# Patient Record
Sex: FEMALE | Race: WHITE | NOT HISPANIC OR LATINO | Employment: UNEMPLOYED | URBAN - NONMETROPOLITAN AREA
[De-identification: names, ages, dates, MRNs, and addresses within clinical notes are randomized per-mention and may not be internally consistent; named-entity substitution may affect disease eponyms.]

---

## 2017-12-18 PROCEDURE — 83001 ASSAY OF GONADOTROPIN (FSH): CPT | Performed by: INTERNAL MEDICINE

## 2018-01-09 PROCEDURE — 87624 HPV HI-RISK TYP POOLED RSLT: CPT | Performed by: OBSTETRICS & GYNECOLOGY

## 2018-01-09 PROCEDURE — 88175 CYTOPATH C/V AUTO FLUID REDO: CPT | Performed by: OBSTETRICS & GYNECOLOGY

## 2023-03-09 LAB
ALBUMIN (MG/L) IN URINE: <7 MG/L
ALBUMIN/CREATININE (UG/MG) IN URINE: NORMAL UG/MG CRT (ref 0–30)
APPEARANCE, URINE: CLEAR
BACTERIA, URINE: ABNORMAL /HPF
BILIRUBIN, URINE: NEGATIVE
BLOOD, URINE: NEGATIVE
COLOR, URINE: YELLOW
CREATININE (MG/DL) IN URINE: 34.3 MG/DL (ref 20–320)
GLUCOSE, URINE: NEGATIVE MG/DL
KETONES, URINE: NEGATIVE MG/DL
LEUKOCYTE ESTERASE, URINE: ABNORMAL
MUCUS, URINE: ABNORMAL /LPF
NITRITE, URINE: NEGATIVE
PH, URINE: 6 (ref 5–8)
PROTEIN, URINE: NEGATIVE MG/DL
RBC, URINE: 1 /HPF (ref 0–5)
SPECIFIC GRAVITY, URINE: 1.01 (ref 1–1.03)
SQUAMOUS EPITHELIAL CELLS, URINE: <1 /HPF
UROBILINOGEN, URINE: <2 MG/DL (ref 0–1.9)
WBC, URINE: 1 /HPF (ref 0–5)

## 2023-04-15 LAB — CALCIDIOL (25 OH VITAMIN D3) (NG/ML) IN SER/PLAS: 56 NG/ML

## 2023-10-06 ASSESSMENT — PROMIS GLOBAL HEALTH SCALE
RATE_AVERAGE_PAIN: 3
RATE_PHYSICAL_HEALTH: FAIR
EMOTIONAL_PROBLEMS: SOMETIMES
CARRYOUT_PHYSICAL_ACTIVITIES: MOSTLY
RATE_AVERAGE_FATIGUE: MILD
RATE_QUALITY_OF_LIFE: GOOD
RATE_GENERAL_HEALTH: GOOD
CARRYOUT_SOCIAL_ACTIVITIES: GOOD
RATE_MENTAL_HEALTH: GOOD
RATE_SOCIAL_SATISFACTION: GOOD

## 2023-10-09 ENCOUNTER — OFFICE VISIT (OUTPATIENT)
Dept: PRIMARY CARE | Facility: CLINIC | Age: 53
End: 2023-10-09
Payer: COMMERCIAL

## 2023-10-09 VITALS
HEIGHT: 67 IN | DIASTOLIC BLOOD PRESSURE: 74 MMHG | SYSTOLIC BLOOD PRESSURE: 120 MMHG | WEIGHT: 117.3 LBS | OXYGEN SATURATION: 98 % | HEART RATE: 62 BPM | BODY MASS INDEX: 18.41 KG/M2 | RESPIRATION RATE: 16 BRPM | TEMPERATURE: 96.1 F

## 2023-10-09 DIAGNOSIS — Z00.00 ENCOUNTER FOR WELLNESS EXAMINATION IN ADULT: Primary | ICD-10-CM

## 2023-10-09 DIAGNOSIS — Z23 IMMUNIZATION DUE: ICD-10-CM

## 2023-10-09 DIAGNOSIS — E56.9 VITAMIN DEFICIENCY: ICD-10-CM

## 2023-10-09 PROBLEM — Q63.9 STRUCTURAL ABNORMALITY OF KIDNEY: Status: ACTIVE | Noted: 2023-10-09

## 2023-10-09 PROBLEM — M77.11 LATERAL EPICONDYLITIS OF BOTH ELBOWS: Status: ACTIVE | Noted: 2023-10-09

## 2023-10-09 PROBLEM — M77.12 LATERAL EPICONDYLITIS OF BOTH ELBOWS: Status: ACTIVE | Noted: 2023-10-09

## 2023-10-09 PROBLEM — B00.9 RECURRENT HSV (HERPES SIMPLEX VIRUS): Status: ACTIVE | Noted: 2023-10-09

## 2023-10-09 PROBLEM — R63.5 ABNORMAL WEIGHT GAIN: Status: ACTIVE | Noted: 2023-10-09

## 2023-10-09 PROBLEM — R63.5 ABNORMAL WEIGHT GAIN: Status: RESOLVED | Noted: 2023-10-09 | Resolved: 2023-10-09

## 2023-10-09 PROBLEM — F41.9 ANXIETY: Status: ACTIVE | Noted: 2022-10-06

## 2023-10-09 PROBLEM — M85.80 OSTEOPENIA: Status: ACTIVE | Noted: 2023-10-09

## 2023-10-09 PROBLEM — K21.9 GASTROESOPHAGEAL REFLUX DISEASE: Status: ACTIVE | Noted: 2022-10-06

## 2023-10-09 PROCEDURE — 1036F TOBACCO NON-USER: CPT | Performed by: FAMILY MEDICINE

## 2023-10-09 PROCEDURE — 90471 IMMUNIZATION ADMIN: CPT | Performed by: FAMILY MEDICINE

## 2023-10-09 PROCEDURE — 90715 TDAP VACCINE 7 YRS/> IM: CPT | Performed by: FAMILY MEDICINE

## 2023-10-09 PROCEDURE — 3008F BODY MASS INDEX DOCD: CPT | Performed by: FAMILY MEDICINE

## 2023-10-09 PROCEDURE — 99396 PREV VISIT EST AGE 40-64: CPT | Performed by: FAMILY MEDICINE

## 2023-10-09 RX ORDER — GLUCOSAM/CHONDRO/HERB 149/HYAL 750-100 MG
TABLET ORAL
COMMUNITY

## 2023-10-09 RX ORDER — DULOXETIN HYDROCHLORIDE 60 MG/1
1 CAPSULE, DELAYED RELEASE ORAL 2 TIMES DAILY
COMMUNITY
Start: 2019-12-30

## 2023-10-09 RX ORDER — CELECOXIB 200 MG/1
200 CAPSULE ORAL 2 TIMES DAILY
COMMUNITY
End: 2023-10-09 | Stop reason: WASHOUT

## 2023-10-09 RX ORDER — CLONAZEPAM 0.5 MG/1
0.5 TABLET ORAL DAILY
COMMUNITY
End: 2023-10-09 | Stop reason: WASHOUT

## 2023-10-09 RX ORDER — ESTRADIOL 10 UG/1
INSERT VAGINAL
COMMUNITY
End: 2024-03-21 | Stop reason: ALTCHOICE

## 2023-10-09 RX ORDER — ACETAMINOPHEN 500 MG
1 TABLET ORAL DAILY
COMMUNITY
Start: 2023-02-14 | End: 2023-10-09 | Stop reason: WASHOUT

## 2023-10-09 RX ORDER — CLONAZEPAM 0.12 MG/1
0.12 TABLET, ORALLY DISINTEGRATING ORAL 2 TIMES DAILY
COMMUNITY
Start: 2023-07-14 | End: 2024-03-21 | Stop reason: ALTCHOICE

## 2023-10-09 RX ORDER — FAMCICLOVIR 500 MG/1
TABLET ORAL
COMMUNITY
Start: 2022-06-17 | End: 2023-11-28 | Stop reason: SDUPTHER

## 2023-10-09 RX ORDER — L.ACID,FERM,PLA,RHA/B.BIF,LONG 126 MG
TABLET, DELAYED AND EXTENDED RELEASE ORAL
COMMUNITY
Start: 2022-09-30

## 2023-10-09 RX ORDER — VALACYCLOVIR HYDROCHLORIDE 1 G/1
TABLET, FILM COATED ORAL
COMMUNITY
Start: 2022-01-20 | End: 2023-10-09 | Stop reason: WASHOUT

## 2023-10-09 RX ORDER — TRAZODONE HYDROCHLORIDE 150 MG/1
150 TABLET ORAL EVERY EVENING
COMMUNITY

## 2023-10-09 RX ORDER — TIZANIDINE HYDROCHLORIDE 2 MG/1
CAPSULE, GELATIN COATED ORAL
COMMUNITY
End: 2024-03-21 | Stop reason: ALTCHOICE

## 2023-10-09 ASSESSMENT — PATIENT HEALTH QUESTIONNAIRE - PHQ9
SUM OF ALL RESPONSES TO PHQ9 QUESTIONS 1 AND 2: 0
1. LITTLE INTEREST OR PLEASURE IN DOING THINGS: NOT AT ALL
2. FEELING DOWN, DEPRESSED OR HOPELESS: NOT AT ALL

## 2023-10-09 NOTE — PROGRESS NOTES
"Subjective   Patient ID: Carol Loza is a 53 y.o. female who presents for a wellness examination.     HPI   Diet: well rounded, avoid dairy  Supplements/vitamins: see list, tylenol scheduled 1000mg 2-3x/day  Alcohol use: 2 glasses wine/day  Caffeine intake: Y, 2 cups coffee/day   Exercise:  1x/week, stretching, PT 2x/week  Sleep: no issues  Last dental appointment: routinely, appt tomorrow  Last eye appointment: 1 year ago  Anxiety/Depression: denies  Menstrual cycles: postmenopause  Last pap smear:GYN 3/2023-   Mammogram: 2/23/2023  Fmhx breast cancer: N  DEXA: 1/25/2023, Osteopenia  Cscope: 3/23/2023, repeat due this year  (polyp)  Fmhx colon cancer: N  CT cardiac score: none found   Tobacco use/Lung cancer screening: N  Recreational drug use: N  Immunizations: due for Tdap    Did PT for right IT band, every other day. Followed by [facility] (Dr.Travis Handley)      Review of Systems   All other systems reviewed and are negative.      Objective   /74 (BP Location: Right arm, Patient Position: Sitting, BP Cuff Size: Adult)   Pulse 62   Temp 35.6 °C (96.1 °F) (Temporal)   Resp 16   Ht 1.702 m (5' 7\")   Wt 53.2 kg (117 lb 4.8 oz)   LMP  (Exact Date)   SpO2 98%   BMI 18.37 kg/m²     Physical Exam  Constitutional: Well developed, well nourished, alert and in no acute distress.  Head and Face: Normocephalic, atraumatic.  Eyes: Normal external exam. Pupils equally round and reactive to light with normal accommodation and extraocular movements intact.   ENT: External inspection of ears normal, tympanic membranes visualized and normal. Nasal mucosa, septum, and turbinates normal. Oral mucosa moist, oropharynx clear.   Neck: Supple, no lymphadenopathy or masses. Thyroid not enlarged, no palpable nodules.   Cardiovascular: Regular rate and rhythm, normal S1 and S2, no murmurs, gallops, or rubs. Radial pulses normal. No peripheral edema. No carotid bruits.   Pulmonary: No respiratory distress, lungs " clear to auscultation bilaterally. No wheezes, rhonchi, rales.    Abdomen: Soft, nontender, nondistended, normal bowel sounds. No masses palpated.   Musculoskeletal: Gait normal. Muscle strength/tone normal of all 4 extremities. Normal range of motion of all extremities.   Skin: Warm, well perfused, normal skin turgor and color, no lesions or rashes noted.   Neurologic: Cranial nerves II-XII grossly intact. Deep tendon reflexes were 2+ and symmetric. Sensation normal bilaterally.   Psychiatric: Mood calm and affect normal.    Assessment/Plan   Recommendations for women annual wellness exam:   Make sure screenings for cervical and breast cancer are up to date if applicable- pap smears age 21-65  Discuss mammogram starting at age 40 or sooner if positive family history of breast cancer-up to date  STD screening   Follow a healthy diet (Dash diet, Mediterranean diet)  Exercise 150 min/wk   Maintain healthy weight (BMI < 25)-your BMI is 18.  Do not smoke   Alcohol in moderation (up to 1 drink/day)  Get enough sleep (7-8 hours/night)  Take a prenatal vitamin with folic acid if possibility of pregnancy   Make sure immunizations are up to date (influenza, Tdap)-administered for Tdap  Premenopausal women need minimum 1,000 mg calcium and 600-800 IU vitamin D daily (combination of diet + supplement)  Talk to your physician if you have concerns about depression or anxiety  Visit dentist twice yearly  Colon Cancer Screening-up to date

## 2023-10-19 ENCOUNTER — LAB (OUTPATIENT)
Dept: LAB | Facility: LAB | Age: 53
End: 2023-10-19
Payer: COMMERCIAL

## 2023-10-19 DIAGNOSIS — E56.9 VITAMIN DEFICIENCY: ICD-10-CM

## 2023-10-19 DIAGNOSIS — Z00.00 ENCOUNTER FOR WELLNESS EXAMINATION IN ADULT: ICD-10-CM

## 2023-10-19 LAB
ALBUMIN SERPL BCP-MCNC: 4.8 G/DL (ref 3.4–5)
ALP SERPL-CCNC: 40 U/L (ref 33–110)
ALT SERPL W P-5'-P-CCNC: 17 U/L (ref 7–45)
ANION GAP SERPL CALC-SCNC: 14 MMOL/L (ref 10–20)
AST SERPL W P-5'-P-CCNC: 17 U/L (ref 9–39)
BILIRUB SERPL-MCNC: 0.7 MG/DL (ref 0–1.2)
BUN SERPL-MCNC: 14 MG/DL (ref 6–23)
CALCIUM SERPL-MCNC: 10.2 MG/DL (ref 8.6–10.6)
CHLORIDE SERPL-SCNC: 104 MMOL/L (ref 98–107)
CHOLEST SERPL-MCNC: 167 MG/DL (ref 0–199)
CHOLESTEROL/HDL RATIO: 2.4
CO2 SERPL-SCNC: 29 MMOL/L (ref 21–32)
CREAT SERPL-MCNC: 0.62 MG/DL (ref 0.5–1.05)
ERYTHROCYTE [DISTWIDTH] IN BLOOD BY AUTOMATED COUNT: 11.7 % (ref 11.5–14.5)
GFR SERPL CREATININE-BSD FRML MDRD: >90 ML/MIN/1.73M*2
GLUCOSE SERPL-MCNC: 97 MG/DL (ref 74–99)
HCT VFR BLD AUTO: 39.5 % (ref 36–46)
HDLC SERPL-MCNC: 69.4 MG/DL
HGB BLD-MCNC: 12.8 G/DL (ref 12–16)
LDLC SERPL CALC-MCNC: 76 MG/DL
MCH RBC QN AUTO: 35.1 PG (ref 26–34)
MCHC RBC AUTO-ENTMCNC: 32.4 G/DL (ref 32–36)
MCV RBC AUTO: 108 FL (ref 80–100)
NON HDL CHOLESTEROL: 98 MG/DL (ref 0–149)
NRBC BLD-RTO: 0 /100 WBCS (ref 0–0)
PLATELET # BLD AUTO: 299 X10*3/UL (ref 150–450)
PMV BLD AUTO: 9.5 FL (ref 7.5–11.5)
POTASSIUM SERPL-SCNC: 4 MMOL/L (ref 3.5–5.3)
PROT SERPL-MCNC: 6.9 G/DL (ref 6.4–8.2)
RBC # BLD AUTO: 3.65 X10*6/UL (ref 4–5.2)
SODIUM SERPL-SCNC: 143 MMOL/L (ref 136–145)
TRIGL SERPL-MCNC: 108 MG/DL (ref 0–149)
VLDL: 22 MG/DL (ref 0–40)
WBC # BLD AUTO: 3 X10*3/UL (ref 4.4–11.3)

## 2023-10-19 PROCEDURE — 82306 VITAMIN D 25 HYDROXY: CPT

## 2023-10-19 PROCEDURE — 85027 COMPLETE CBC AUTOMATED: CPT

## 2023-10-19 PROCEDURE — 80053 COMPREHEN METABOLIC PANEL: CPT

## 2023-10-19 PROCEDURE — 80061 LIPID PANEL: CPT

## 2023-10-19 PROCEDURE — 36415 COLL VENOUS BLD VENIPUNCTURE: CPT

## 2023-10-20 LAB — 25(OH)D3 SERPL-MCNC: 49 NG/ML (ref 30–100)

## 2023-10-23 ENCOUNTER — LAB (OUTPATIENT)
Dept: LAB | Facility: LAB | Age: 53
End: 2023-10-23
Payer: COMMERCIAL

## 2023-10-23 DIAGNOSIS — R71.8 ELEVATED MCV: Primary | ICD-10-CM

## 2023-10-23 DIAGNOSIS — D72.819 LEUKOPENIA, UNSPECIFIED TYPE: ICD-10-CM

## 2023-10-23 DIAGNOSIS — R71.8 ELEVATED MCV: ICD-10-CM

## 2023-10-23 PROCEDURE — 36415 COLL VENOUS BLD VENIPUNCTURE: CPT

## 2023-10-23 PROCEDURE — 82607 VITAMIN B-12: CPT

## 2023-10-24 LAB — VIT B12 SERPL-MCNC: 464 PG/ML (ref 211–911)

## 2023-11-28 ENCOUNTER — LAB (OUTPATIENT)
Dept: LAB | Facility: LAB | Age: 53
End: 2023-11-28
Payer: COMMERCIAL

## 2023-11-28 DIAGNOSIS — B00.9 RECURRENT HSV (HERPES SIMPLEX VIRUS): ICD-10-CM

## 2023-11-28 DIAGNOSIS — D72.819 LEUKOPENIA, UNSPECIFIED TYPE: ICD-10-CM

## 2023-11-28 PROCEDURE — 85027 COMPLETE CBC AUTOMATED: CPT

## 2023-11-28 PROCEDURE — 36415 COLL VENOUS BLD VENIPUNCTURE: CPT

## 2023-11-28 RX ORDER — FAMCICLOVIR 500 MG/1
TABLET ORAL
Qty: 3 TABLET | Refills: 0 | Status: SHIPPED | OUTPATIENT
Start: 2023-11-28

## 2023-11-29 DIAGNOSIS — D72.819 LEUKOPENIA, UNSPECIFIED TYPE: Primary | ICD-10-CM

## 2023-11-29 LAB
ERYTHROCYTE [DISTWIDTH] IN BLOOD BY AUTOMATED COUNT: 11.5 % (ref 11.5–14.5)
HCT VFR BLD AUTO: 38.8 % (ref 36–46)
HGB BLD-MCNC: 12.7 G/DL (ref 12–16)
MCH RBC QN AUTO: 34.9 PG (ref 26–34)
MCHC RBC AUTO-ENTMCNC: 32.7 G/DL (ref 32–36)
MCV RBC AUTO: 107 FL (ref 80–100)
NRBC BLD-RTO: 0 /100 WBCS (ref 0–0)
PLATELET # BLD AUTO: 260 X10*3/UL (ref 150–450)
RBC # BLD AUTO: 3.64 X10*6/UL (ref 4–5.2)
WBC # BLD AUTO: 4.1 X10*3/UL (ref 4.4–11.3)

## 2024-01-12 ENCOUNTER — APPOINTMENT (OUTPATIENT)
Dept: HEMATOLOGY/ONCOLOGY | Facility: CLINIC | Age: 54
End: 2024-01-12
Payer: COMMERCIAL

## 2024-01-19 ENCOUNTER — OFFICE VISIT (OUTPATIENT)
Dept: HEMATOLOGY/ONCOLOGY | Facility: CLINIC | Age: 54
End: 2024-01-19
Payer: COMMERCIAL

## 2024-01-19 ENCOUNTER — DOCUMENTATION (OUTPATIENT)
Dept: HEMATOLOGY/ONCOLOGY | Facility: CLINIC | Age: 54
End: 2024-01-19

## 2024-01-19 VITALS
SYSTOLIC BLOOD PRESSURE: 122 MMHG | HEART RATE: 88 BPM | OXYGEN SATURATION: 98 % | BODY MASS INDEX: 18.17 KG/M2 | WEIGHT: 116 LBS | DIASTOLIC BLOOD PRESSURE: 86 MMHG | TEMPERATURE: 98.2 F

## 2024-01-19 DIAGNOSIS — D72.819 LEUKOPENIA, UNSPECIFIED TYPE: ICD-10-CM

## 2024-01-19 PROCEDURE — 3008F BODY MASS INDEX DOCD: CPT | Performed by: INTERNAL MEDICINE

## 2024-01-19 PROCEDURE — 1036F TOBACCO NON-USER: CPT | Performed by: INTERNAL MEDICINE

## 2024-01-19 PROCEDURE — 99214 OFFICE O/P EST MOD 30 MIN: CPT | Performed by: INTERNAL MEDICINE

## 2024-01-19 PROCEDURE — 99204 OFFICE O/P NEW MOD 45 MIN: CPT | Performed by: INTERNAL MEDICINE

## 2024-01-19 ASSESSMENT — PAIN SCALES - GENERAL: PAINLEVEL: 0-NO PAIN

## 2024-01-19 NOTE — PROGRESS NOTES
Patient seen by Dr. Gary today in clinic. Reinforcement education provided regarding next steps with plan of care.  Pt to have labs today however lab department is currently closed.  Pt to return for lab work next week.  FUV in 4 weeks.  Office contact information provided along with tour of department.  Patient verbalizes understanding of plan of care via teachback method.

## 2024-01-19 NOTE — PROGRESS NOTES
Patient ID: Carol Loza is a 53 y.o. female.  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  The patient sees Dr. Lott for routine evaluations.  A CBC in 2023 revealed WBC 4.1 hemoglobin 12.7 g/dL , platelets 260,000/mm³.  Yet another CBC 3 months ago revealed WBC 3, hemoglobin 12.8 g/dL, , platelets 299,000/mm³.  4 years ago WBC 4.5 hemoglobin 12.6  platelets 361,000/mm³.  The patient is asymptomatic.    At interview on 2024 patient denied history of recurrent localized or systemic infections, weight loss, night sweats, chest pain, shortness of breath, nausea, vomiting, hematemesis, melena, hematochezia and hematuria.  Review of Systems   All other systems reviewed and are negative.       Objective   BSA: 1.58 meters squared  /86   Pulse 88   Temp 36.8 °C (98.2 °F) (Temporal)   Wt 52.6 kg (116 lb)   SpO2 98%   BMI 18.17 kg/m²      has a past medical history of Arthritis (), Personal history of other mental and behavioral disorders, Personal history of other mental and behavioral disorders, and Sacrococcygeal disorders, not elsewhere classified.   has a past surgical history that includes Other surgical history (2019);  section, low transverse (); and [place] tooth extraction ().  Family History   Problem Relation Name Age of Onset    Depression Mother      Hyperlipidemia Father Zig     Hypertension Father Zig      Oncology History    No history exists.       Carol Loza  reports that she has never smoked. She has never used smokeless tobacco.  She  reports current alcohol use.  She  reports no history of drug use.    Physical  Exam  Constitutional:       Appearance: Normal appearance.   HENT:      Head: Normocephalic and atraumatic.      Nose: Nose normal.      Mouth/Throat:      Mouth: Mucous membranes are moist.      Pharynx: Oropharynx is clear.   Eyes:      Extraocular Movements: Extraocular movements intact.      Conjunctiva/sclera: Conjunctivae normal.      Pupils: Pupils are equal, round, and reactive to light.   Cardiovascular:      Rate and Rhythm: Normal rate and regular rhythm.   Pulmonary:      Effort: Pulmonary effort is normal.      Breath sounds: Normal breath sounds.   Abdominal:      General: Abdomen is flat. Bowel sounds are normal.      Palpations: Abdomen is soft.   Musculoskeletal:         General: Normal range of motion.      Cervical back: Normal range of motion and neck supple.   Neurological:      General: No focal deficit present.      Mental Status: She is alert and oriented to person, place, and time. Mental status is at baseline.   Psychiatric:         Mood and Affect: Mood normal.         Behavior: Behavior normal.         Thought Content: Thought content normal.         Judgment: Judgment normal.         WBC   Date/Time Value Ref Range Status   11/28/2023 01:09 PM 4.1 (L) 4.4 - 11.3 x10*3/uL Final   10/19/2023 11:44 AM 3.0 (L) 4.4 - 11.3 x10*3/uL Final   10/23/2021 09:02 AM 3.6 (L) 4.4 - 11.3 x10E9/L Final   01/02/2020 11:41 AM 4.5 4.4 - 11.3 x10E9/L Final     nRBC   Date Value Ref Range Status   11/28/2023 0.0 0.0 - 0.0 /100 WBCs Final   10/19/2023 0.0 0.0 - 0.0 /100 WBCs Final   10/23/2021 0.0 0.0 - 0.0 /100 WBC Final   01/02/2020 0.0 0.0 - 0.0 /100 WBC Final     RBC   Date Value Ref Range Status   11/28/2023 3.64 (L) 4.00 - 5.20 x10*6/uL Final   10/19/2023 3.65 (L) 4.00 - 5.20 x10*6/uL Final   10/23/2021 3.78 (L) 4.00 - 5.20 x10E12/L Final   01/02/2020 3.71 (L) 4.00 - 5.20 x10E12/L Final     Hemoglobin   Date Value Ref Range Status   11/28/2023 12.7 12.0 - 16.0 g/dL Final   10/19/2023 12.8 12.0 - 16.0  g/dL Final   10/23/2021 12.7 12.0 - 16.0 g/dL Final   01/02/2020 12.6 12.0 - 16.0 g/dL Final     Hematocrit   Date Value Ref Range Status   11/28/2023 38.8 36.0 - 46.0 % Final   10/19/2023 39.5 36.0 - 46.0 % Final   10/23/2021 38.9 36.0 - 46.0 % Final   01/02/2020 38.2 36.0 - 46.0 % Final     MCV   Date/Time Value Ref Range Status   11/28/2023 01:09  (H) 80 - 100 fL Final   10/19/2023 11:44  (H) 80 - 100 fL Final   10/23/2021 09:02  (H) 80 - 100 fL Final   01/02/2020 11:41  (H) 80 - 100 fL Final     MCH   Date/Time Value Ref Range Status   11/28/2023 01:09 PM 34.9 (H) 26.0 - 34.0 pg Final   10/19/2023 11:44 AM 35.1 (H) 26.0 - 34.0 pg Final     MCHC   Date/Time Value Ref Range Status   11/28/2023 01:09 PM 32.7 32.0 - 36.0 g/dL Final   10/19/2023 11:44 AM 32.4 32.0 - 36.0 g/dL Final   10/23/2021 09:02 AM 32.6 32.0 - 36.0 g/dL Final   01/02/2020 11:41 AM 33.0 32.0 - 36.0 g/dL Final     RDW   Date/Time Value Ref Range Status   11/28/2023 01:09 PM 11.5 11.5 - 14.5 % Final   10/19/2023 11:44 AM 11.7 11.5 - 14.5 % Final   10/23/2021 09:02 AM 11.9 11.5 - 14.5 % Final   01/02/2020 11:41 AM 11.5 11.5 - 14.5 % Final     Platelets   Date/Time Value Ref Range Status   11/28/2023 01:09  150 - 450 x10*3/uL Final   10/19/2023 11:44  150 - 450 x10*3/uL Final   10/23/2021 09:02  150 - 450 x10E9/L Final   01/02/2020 11:41  150 - 450 x10E9/L Final     MPV   Date/Time Value Ref Range Status   10/19/2023 11:44 AM 9.5 7.5 - 11.5 fL Final     Neutrophils %   Date/Time Value Ref Range Status   10/23/2021 09:02 AM 46.3 40.0 - 80.0 % Final   01/02/2020 11:41 AM 58.2 40.0 - 80.0 % Final     Immature Granulocytes %, Automated   Date/Time Value Ref Range Status   10/23/2021 09:02 AM 0.3 0.0 - 0.9 % Final     Comment:      Immature Granulocyte Count (IG) includes promyelocytes,    myelocytes and metamyelocytes but does not include bands.   Percent differential counts (%) should be interpreted in  "the   context of the absolute cell counts (cells/L).     01/02/2020 11:41 AM 0.7 0.0 - 0.9 % Final     Comment:      Percent differential counts (%) should be interpreted in the   context of the absolute cell counts (cells/L).       Lymphocytes %   Date/Time Value Ref Range Status   10/23/2021 09:02 AM 36.6 13.0 - 44.0 % Final   01/02/2020 11:41 AM 28.4 13.0 - 44.0 % Final     Monocytes %   Date/Time Value Ref Range Status   10/23/2021 09:02 AM 11.3 2.0 - 10.0 % Final   01/02/2020 11:41 AM 10.7 2.0 - 10.0 % Final     Eosinophils %   Date/Time Value Ref Range Status   10/23/2021 09:02 AM 3.0 0.0 - 6.0 % Final   01/02/2020 11:41 AM 0.9 0.0 - 6.0 % Final     Basophils %   Date/Time Value Ref Range Status   10/23/2021 09:02 AM 2.5 0.0 - 2.0 % Final   01/02/2020 11:41 AM 1.1 0.0 - 2.0 % Final     Neutrophils Absolute   Date/Time Value Ref Range Status   10/23/2021 09:02 AM 1.68 1.20 - 7.70 x10E9/L Final   01/02/2020 11:41 AM 2.60 1.20 - 7.70 x10E9/L Final     No results found for: \"IGABSOL\"  Lymphocytes Absolute   Date/Time Value Ref Range Status   10/23/2021 09:02 AM 1.33 1.20 - 4.80 x10E9/L Final   01/02/2020 11:41 AM 1.27 1.20 - 4.80 x10E9/L Final     Monocytes Absolute   Date/Time Value Ref Range Status   10/23/2021 09:02 AM 0.41 0.10 - 1.00 x10E9/L Final   01/02/2020 11:41 AM 0.48 0.10 - 1.00 x10E9/L Final     Eosinophils Absolute   Date/Time Value Ref Range Status   10/23/2021 09:02 AM 0.11 0.00 - 0.70 x10E9/L Final   01/02/2020 11:41 AM 0.04 0.00 - 0.70 x10E9/L Final     Basophils Absolute   Date/Time Value Ref Range Status   10/23/2021 09:02 AM 0.09 0.00 - 0.10 x10E9/L Final   01/02/2020 11:41 AM 0.05 0.00 - 0.10 x10E9/L Final       No components found for: \"PT\"  No results found for: \"APTT\"    Assessment/Plan    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  The patient denied history of recurrent " localized or systemic infections.  Differential diagnosis includes constitutional, autoimmune, drug-induced, deficiency state, cyclical versus others.  Physical examination was within normal limits.  It would be prudent to rule out any correctable deficiencies.  I have recommended further evaluation with CBC, iron indicis, vitamin B12, folate, and TSH levels.  Depending on what we find we will make further recommendations.  There were no therapeutic recommendations today.  The patient understood appreciated all the details provided and was grateful.    Thank you for allowing me to participate in care of your patient if you have any questions please feel free to call me.     Diagnoses and all orders for this visit:  Leukopenia, unspecified type  -     Referral to Hematology and Oncology  -     CBC and Auto Differential; Future  -     Comprehensive Metabolic Panel; Future  -     Ferritin; Future  -     Folate; Future  -     Haptoglobin; Future  -     Iron and TIBC; Future  -     TSH with reflex to Free T4 if abnormal; Future  -     Vitamin B12; Future  -     DERRICK + SHARON Panel; Future  -     Clinic Appointment Request; Future         Ac Gary MD

## 2024-01-25 ENCOUNTER — LAB (OUTPATIENT)
Dept: LAB | Facility: CLINIC | Age: 54
End: 2024-01-25
Payer: COMMERCIAL

## 2024-01-25 DIAGNOSIS — D72.819 LEUKOPENIA, UNSPECIFIED TYPE: ICD-10-CM

## 2024-01-25 LAB
BASOPHILS # BLD AUTO: 0.04 X10*3/UL (ref 0–0.1)
BASOPHILS NFR BLD AUTO: 0.9 %
EOSINOPHIL # BLD AUTO: 0.01 X10*3/UL (ref 0–0.7)
EOSINOPHIL NFR BLD AUTO: 0.2 %
ERYTHROCYTE [DISTWIDTH] IN BLOOD BY AUTOMATED COUNT: 11.1 % (ref 11.5–14.5)
HCT VFR BLD AUTO: 37.6 % (ref 36–46)
HGB BLD-MCNC: 12.7 G/DL (ref 12–16)
IMM GRANULOCYTES # BLD AUTO: 0.01 X10*3/UL (ref 0–0.7)
IMM GRANULOCYTES NFR BLD AUTO: 0.2 % (ref 0–0.9)
LYMPHOCYTES # BLD AUTO: 1.4 X10*3/UL (ref 1.2–4.8)
LYMPHOCYTES NFR BLD AUTO: 32 %
MCH RBC QN AUTO: 34.5 PG (ref 26–34)
MCHC RBC AUTO-ENTMCNC: 33.8 G/DL (ref 32–36)
MCV RBC AUTO: 102 FL (ref 80–100)
MONOCYTES # BLD AUTO: 0.45 X10*3/UL (ref 0.1–1)
MONOCYTES NFR BLD AUTO: 10.3 %
NEUTROPHILS # BLD AUTO: 2.47 X10*3/UL (ref 1.2–7.7)
NEUTROPHILS NFR BLD AUTO: 56.4 %
NRBC BLD-RTO: ABNORMAL /100{WBCS}
PLATELET # BLD AUTO: 238 X10*3/UL (ref 150–450)
RBC # BLD AUTO: 3.68 X10*6/UL (ref 4–5.2)
WBC # BLD AUTO: 4.4 X10*3/UL (ref 4.4–11.3)

## 2024-01-25 PROCEDURE — 82607 VITAMIN B-12: CPT

## 2024-01-25 PROCEDURE — 85025 COMPLETE CBC W/AUTO DIFF WBC: CPT

## 2024-01-25 PROCEDURE — 84075 ASSAY ALKALINE PHOSPHATASE: CPT

## 2024-01-25 PROCEDURE — 83010 ASSAY OF HAPTOGLOBIN QUANT: CPT

## 2024-01-25 PROCEDURE — 86235 NUCLEAR ANTIGEN ANTIBODY: CPT

## 2024-01-25 PROCEDURE — 84443 ASSAY THYROID STIM HORMONE: CPT

## 2024-01-25 PROCEDURE — 83540 ASSAY OF IRON: CPT

## 2024-01-25 PROCEDURE — 82728 ASSAY OF FERRITIN: CPT

## 2024-01-25 PROCEDURE — 86038 ANTINUCLEAR ANTIBODIES: CPT

## 2024-01-25 PROCEDURE — 82746 ASSAY OF FOLIC ACID SERUM: CPT

## 2024-01-25 PROCEDURE — 36415 COLL VENOUS BLD VENIPUNCTURE: CPT

## 2024-01-26 LAB
ALBUMIN SERPL BCP-MCNC: 4.8 G/DL (ref 3.4–5)
ALP SERPL-CCNC: 42 U/L (ref 33–110)
ALT SERPL W P-5'-P-CCNC: 15 U/L (ref 7–45)
ANION GAP SERPL CALC-SCNC: 14 MMOL/L (ref 10–20)
AST SERPL W P-5'-P-CCNC: 17 U/L (ref 9–39)
BILIRUB SERPL-MCNC: 0.4 MG/DL (ref 0–1.2)
BUN SERPL-MCNC: 17 MG/DL (ref 6–23)
CALCIUM SERPL-MCNC: 10.1 MG/DL (ref 8.6–10.6)
CENTROMERE B AB SER-ACNC: <0.2 AI
CHLORIDE SERPL-SCNC: 101 MMOL/L (ref 98–107)
CHROMATIN AB SERPL-ACNC: <0.2 AI
CO2 SERPL-SCNC: 29 MMOL/L (ref 21–32)
CREAT SERPL-MCNC: 0.69 MG/DL (ref 0.5–1.05)
DSDNA AB SER-ACNC: <1 IU/ML
EGFRCR SERPLBLD CKD-EPI 2021: >90 ML/MIN/1.73M*2
ENA JO1 AB SER QL IA: <0.2 AI
ENA RNP AB SER IA-ACNC: 0.2 AI
ENA SCL70 AB SER QL IA: <0.2 AI
ENA SM AB SER IA-ACNC: <0.2 AI
ENA SM+RNP AB SER QL IA: <0.2 AI
ENA SS-A AB SER IA-ACNC: <0.2 AI
ENA SS-B AB SER IA-ACNC: <0.2 AI
FERRITIN SERPL-MCNC: 70 NG/ML (ref 8–150)
FOLATE SERPL-MCNC: >24 NG/ML
GLUCOSE SERPL-MCNC: 99 MG/DL (ref 74–99)
HAPTOGLOB SERPL-MCNC: 24 MG/DL (ref 37–246)
IRON SATN MFR SERPL: 43 % (ref 25–45)
IRON SERPL-MCNC: 157 UG/DL (ref 35–150)
POTASSIUM SERPL-SCNC: 4.5 MMOL/L (ref 3.5–5.3)
PROT SERPL-MCNC: 7 G/DL (ref 6.4–8.2)
RIBOSOMAL P AB SER-ACNC: <0.2 AI
SODIUM SERPL-SCNC: 139 MMOL/L (ref 136–145)
TIBC SERPL-MCNC: 368 UG/DL (ref 240–445)
TSH SERPL-ACNC: 0.78 MIU/L (ref 0.44–3.98)
UIBC SERPL-MCNC: 211 UG/DL (ref 110–370)
VIT B12 SERPL-MCNC: 438 PG/ML (ref 211–911)

## 2024-01-29 LAB — ANA SER QL HEP2 SUBST: NEGATIVE

## 2024-02-14 PROBLEM — D72.819 LEUKOPENIA: Status: ACTIVE | Noted: 2023-11-28

## 2024-02-14 PROBLEM — M19.079 OSTEOARTHRITIS OF FIRST METATARSOPHALANGEAL JOINT: Status: ACTIVE | Noted: 2020-11-17

## 2024-02-14 PROBLEM — M53.3 PAIN IN THE COCCYX: Status: ACTIVE | Noted: 2024-02-14

## 2024-02-14 PROBLEM — Z86.59 HISTORY OF DEPRESSION: Status: ACTIVE | Noted: 2024-02-14

## 2024-02-14 PROBLEM — M76.31 CHRONIC ILIOTIBIAL BAND SYNDROME OF RIGHT SIDE: Status: ACTIVE | Noted: 2022-12-12

## 2024-02-14 PROBLEM — N28.1 CYST OF KIDNEY, ACQUIRED: Status: ACTIVE | Noted: 2023-03-08

## 2024-02-14 PROBLEM — N28.9 DISORDER OF KIDNEY: Status: ACTIVE | Noted: 2023-03-08

## 2024-02-14 PROBLEM — E56.9 VITAMIN DEFICIENCY: Status: ACTIVE | Noted: 2023-10-19

## 2024-02-14 PROBLEM — R71.8 OTHER ABNORMALITY OF RED BLOOD CELLS: Status: ACTIVE | Noted: 2023-10-23

## 2024-02-14 PROBLEM — K63.5 INTESTINAL POLYP: Status: ACTIVE | Noted: 2024-02-09

## 2024-02-14 PROBLEM — B00.9 RECURRENT HERPES SIMPLEX: Status: ACTIVE | Noted: 2023-10-09

## 2024-02-14 PROBLEM — F32.A DEPRESSION: Status: ACTIVE | Noted: 2024-02-09

## 2024-02-14 RX ORDER — ONDANSETRON 4 MG/1
TABLET, FILM COATED ORAL
COMMUNITY
Start: 2023-12-22 | End: 2024-03-21 | Stop reason: ALTCHOICE

## 2024-02-14 RX ORDER — DEXTROMETHORPHAN HYDROBROMIDE, GUAIFENESIN 5; 100 MG/5ML; MG/5ML
LIQUID ORAL
COMMUNITY
Start: 2024-01-17 | End: 2024-03-21 | Stop reason: ALTCHOICE

## 2024-02-14 RX ORDER — CLONAZEPAM 0.5 MG/1
0.5 TABLET ORAL DAILY
COMMUNITY
Start: 2023-12-05

## 2024-02-14 RX ORDER — MULTIVIT-MIN/IRON FUM/FOLIC AC 7.5 MG-4
TABLET ORAL
COMMUNITY
Start: 2024-02-09

## 2024-02-16 ENCOUNTER — OFFICE VISIT (OUTPATIENT)
Dept: HEMATOLOGY/ONCOLOGY | Facility: CLINIC | Age: 54
End: 2024-02-16
Payer: COMMERCIAL

## 2024-02-16 VITALS
SYSTOLIC BLOOD PRESSURE: 118 MMHG | BODY MASS INDEX: 18.96 KG/M2 | TEMPERATURE: 97.5 F | RESPIRATION RATE: 14 BRPM | HEART RATE: 81 BPM | WEIGHT: 121.03 LBS | DIASTOLIC BLOOD PRESSURE: 78 MMHG | OXYGEN SATURATION: 98 %

## 2024-02-16 DIAGNOSIS — D72.819 LEUKOPENIA, UNSPECIFIED TYPE: ICD-10-CM

## 2024-02-16 PROCEDURE — 99213 OFFICE O/P EST LOW 20 MIN: CPT | Performed by: INTERNAL MEDICINE

## 2024-02-16 PROCEDURE — 3008F BODY MASS INDEX DOCD: CPT | Performed by: INTERNAL MEDICINE

## 2024-02-16 PROCEDURE — 1036F TOBACCO NON-USER: CPT | Performed by: INTERNAL MEDICINE

## 2024-02-16 ASSESSMENT — PAIN SCALES - GENERAL: PAINLEVEL: 0-NO PAIN

## 2024-02-16 NOTE — PROGRESS NOTES
Patient ID: Carol Loza is a 53 y.o. female.  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  The patient sees Dr. Lott for routine evaluations.  A CBC in 2023 revealed WBC 4.1 hemoglobin 12.7 g/dL , platelets 260,000/mm³.  Yet another CBC 3 months ago revealed WBC 3, hemoglobin 12.8 g/dL, , platelets 299,000/mm³.  4 years ago WBC 4.5 hemoglobin 12.6  platelets 361,000/mm³.  The patient is asymptomatic.    At interview on 2024 patient denied history of recurrent localized or systemic infections, weight loss, night sweats, chest pain, shortness of breath, nausea, vomiting, hematemesis, melena, hematochezia and hematuria.    The patient had come for follow-up on 2024 regarding history of neutropenia.  The patient is asymptomatic but anxious to know results of the tests performed.  Review of Systems   All other systems reviewed and are negative.       Objective   BSA: 1.61 meters squared  /78   Pulse 81   Temp 36.4 °C (97.5 °F)   Resp 14   Wt 54.9 kg (121 lb 0.5 oz)   SpO2 98%   BMI 18.96 kg/m²      has a past medical history of Arthritis (), Personal history of other mental and behavioral disorders, Personal history of other mental and behavioral disorders, and Sacrococcygeal disorders, not elsewhere classified.   has a past surgical history that includes Other surgical history (2019);  section, low transverse (); and [place] tooth extraction ().  Family History   Problem Relation Name Age of Onset    Depression Mother      Hyperlipidemia Father Zig     Hypertension Father Zig      Oncology History    No history exists.       Carol Loza   reports that she has never smoked. She has never used smokeless tobacco.  She  reports current alcohol use.  She  reports no history of drug use.    Physical Exam  Constitutional:       Appearance: Normal appearance.   HENT:      Head: Normocephalic and atraumatic.      Nose: Nose normal.      Mouth/Throat:      Mouth: Mucous membranes are moist.      Pharynx: Oropharynx is clear.   Eyes:      Extraocular Movements: Extraocular movements intact.      Conjunctiva/sclera: Conjunctivae normal.      Pupils: Pupils are equal, round, and reactive to light.   Cardiovascular:      Rate and Rhythm: Normal rate and regular rhythm.   Pulmonary:      Effort: Pulmonary effort is normal.      Breath sounds: Normal breath sounds.   Abdominal:      General: Abdomen is flat. Bowel sounds are normal.      Palpations: Abdomen is soft.   Musculoskeletal:         General: Normal range of motion.      Cervical back: Normal range of motion and neck supple.   Neurological:      General: No focal deficit present.      Mental Status: She is alert and oriented to person, place, and time. Mental status is at baseline.   Psychiatric:         Mood and Affect: Mood normal.         Behavior: Behavior normal.         Thought Content: Thought content normal.         Judgment: Judgment normal.         WBC   Date/Time Value Ref Range Status   01/25/2024 03:13 PM 4.4 4.4 - 11.3 x10*3/uL Final   11/28/2023 01:09 PM 4.1 (L) 4.4 - 11.3 x10*3/uL Final   10/19/2023 11:44 AM 3.0 (L) 4.4 - 11.3 x10*3/uL Final     nRBC   Date Value Ref Range Status   01/25/2024   Final     Comment:     Not Measured   11/28/2023 0.0 0.0 - 0.0 /100 WBCs Final   10/19/2023 0.0 0.0 - 0.0 /100 WBCs Final     RBC   Date Value Ref Range Status   01/25/2024 3.68 (L) 4.00 - 5.20 x10*6/uL Final   11/28/2023 3.64 (L) 4.00 - 5.20 x10*6/uL Final   10/19/2023 3.65 (L) 4.00 - 5.20 x10*6/uL Final     Hemoglobin   Date Value Ref Range Status   01/25/2024 12.7 12.0 - 16.0 g/dL Final    11/28/2023 12.7 12.0 - 16.0 g/dL Final   10/19/2023 12.8 12.0 - 16.0 g/dL Final     Hematocrit   Date Value Ref Range Status   01/25/2024 37.6 36.0 - 46.0 % Final   11/28/2023 38.8 36.0 - 46.0 % Final   10/19/2023 39.5 36.0 - 46.0 % Final     MCV   Date/Time Value Ref Range Status   01/25/2024 03:13  (H) 80 - 100 fL Final   11/28/2023 01:09  (H) 80 - 100 fL Final   10/19/2023 11:44  (H) 80 - 100 fL Final     MCH   Date/Time Value Ref Range Status   01/25/2024 03:13 PM 34.5 (H) 26.0 - 34.0 pg Final   11/28/2023 01:09 PM 34.9 (H) 26.0 - 34.0 pg Final   10/19/2023 11:44 AM 35.1 (H) 26.0 - 34.0 pg Final     MCHC   Date/Time Value Ref Range Status   01/25/2024 03:13 PM 33.8 32.0 - 36.0 g/dL Final   11/28/2023 01:09 PM 32.7 32.0 - 36.0 g/dL Final   10/19/2023 11:44 AM 32.4 32.0 - 36.0 g/dL Final     RDW   Date/Time Value Ref Range Status   01/25/2024 03:13 PM 11.1 (L) 11.5 - 14.5 % Final   11/28/2023 01:09 PM 11.5 11.5 - 14.5 % Final   10/19/2023 11:44 AM 11.7 11.5 - 14.5 % Final     Platelets   Date/Time Value Ref Range Status   01/25/2024 03:13  150 - 450 x10*3/uL Final   11/28/2023 01:09  150 - 450 x10*3/uL Final   10/19/2023 11:44  150 - 450 x10*3/uL Final     MPV   Date/Time Value Ref Range Status   10/19/2023 11:44 AM 9.5 7.5 - 11.5 fL Final     Neutrophils %   Date/Time Value Ref Range Status   01/25/2024 03:13 PM 56.4 40.0 - 80.0 % Final   10/23/2021 09:02 AM 46.3 40.0 - 80.0 % Final   01/02/2020 11:41 AM 58.2 40.0 - 80.0 % Final     Immature Granulocytes %, Automated   Date/Time Value Ref Range Status   01/25/2024 03:13 PM 0.2 0.0 - 0.9 % Final     Comment:     Immature Granulocyte Count (IG) includes promyelocytes, myelocytes and metamyelocytes but does not include bands. Percent differential counts (%) should be interpreted in the context of the absolute cell counts (cells/UL).   10/23/2021 09:02 AM 0.3 0.0 - 0.9 % Final     Comment:      Immature Granulocyte Count (IG)  includes promyelocytes,    myelocytes and metamyelocytes but does not include bands.   Percent differential counts (%) should be interpreted in the   context of the absolute cell counts (cells/L).     01/02/2020 11:41 AM 0.7 0.0 - 0.9 % Final     Comment:      Percent differential counts (%) should be interpreted in the   context of the absolute cell counts (cells/L).       Lymphocytes %   Date/Time Value Ref Range Status   01/25/2024 03:13 PM 32.0 13.0 - 44.0 % Final   10/23/2021 09:02 AM 36.6 13.0 - 44.0 % Final   01/02/2020 11:41 AM 28.4 13.0 - 44.0 % Final     Monocytes %   Date/Time Value Ref Range Status   01/25/2024 03:13 PM 10.3 2.0 - 10.0 % Final   10/23/2021 09:02 AM 11.3 2.0 - 10.0 % Final   01/02/2020 11:41 AM 10.7 2.0 - 10.0 % Final     Eosinophils %   Date/Time Value Ref Range Status   01/25/2024 03:13 PM 0.2 0.0 - 6.0 % Final   10/23/2021 09:02 AM 3.0 0.0 - 6.0 % Final   01/02/2020 11:41 AM 0.9 0.0 - 6.0 % Final     Basophils %   Date/Time Value Ref Range Status   01/25/2024 03:13 PM 0.9 0.0 - 2.0 % Final   10/23/2021 09:02 AM 2.5 0.0 - 2.0 % Final   01/02/2020 11:41 AM 1.1 0.0 - 2.0 % Final     Neutrophils Absolute   Date/Time Value Ref Range Status   01/25/2024 03:13 PM 2.47 1.20 - 7.70 x10*3/uL Final     Comment:     Percent differential counts (%) should be interpreted in the context of the absolute cell counts (cells/uL).   10/23/2021 09:02 AM 1.68 1.20 - 7.70 x10E9/L Final   01/02/2020 11:41 AM 2.60 1.20 - 7.70 x10E9/L Final     Immature Granulocytes Absolute, Automated   Date/Time Value Ref Range Status   01/25/2024 03:13 PM 0.01 0.00 - 0.70 x10*3/uL Final     Lymphocytes Absolute   Date/Time Value Ref Range Status   01/25/2024 03:13 PM 1.40 1.20 - 4.80 x10*3/uL Final   10/23/2021 09:02 AM 1.33 1.20 - 4.80 x10E9/L Final   01/02/2020 11:41 AM 1.27 1.20 - 4.80 x10E9/L Final     Monocytes Absolute   Date/Time Value Ref Range Status   01/25/2024 03:13 PM 0.45 0.10 - 1.00 x10*3/uL Final   10/23/2021  "09:02 AM 0.41 0.10 - 1.00 x10E9/L Final   01/02/2020 11:41 AM 0.48 0.10 - 1.00 x10E9/L Final     Eosinophils Absolute   Date/Time Value Ref Range Status   01/25/2024 03:13 PM 0.01 0.00 - 0.70 x10*3/uL Final   10/23/2021 09:02 AM 0.11 0.00 - 0.70 x10E9/L Final   01/02/2020 11:41 AM 0.04 0.00 - 0.70 x10E9/L Final     Basophils Absolute   Date/Time Value Ref Range Status   01/25/2024 03:13 PM 0.04 0.00 - 0.10 x10*3/uL Final   10/23/2021 09:02 AM 0.09 0.00 - 0.10 x10E9/L Final   01/02/2020 11:41 AM 0.05 0.00 - 0.10 x10E9/L Final       No components found for: \"PT\"  No results found for: \"APTT\"    Assessment/Plan    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  The patient denied history of recurrent localized or systemic infections.  Differential diagnosis includes constitutional, autoimmune, drug-induced, deficiency state, cyclical versus others.  Physical examination was within normal limits.  It would be prudent to rule out any correctable deficiencies.  I have recommended further evaluation with CBC, iron indicis, vitamin B12, folate, and TSH levels.  Depending on what we find we will make further recommendations.  There were no therapeutic recommendations today.  The patient understood appreciated all the details provided and was grateful.    The patient had come for follow-up on February 16, 2024 regarding history of leukopenia.    Thank you for allowing me to participate in care of your patient if you have any questions please feel free to call me.  Reviewed lab data with the patient.  CBC on January 25, 2024 revealed WBC 4.4 hemoglobin 12.7 g/dL, platelets 238,000/mm³.  Haptoglobin slightly decreased at 24 NG per mL and serum iron slightly elevated at 157 mcg/dL.  The patient is asymptomatic.  We have elected to follow clinically reassess in 6 months.  The patient is pleased and agreeable.   "   Diagnoses and all orders for this visit:  Leukopenia, unspecified type  -     Referral to Hematology and Oncology  -     CBC and Auto Differential; Future  -     Comprehensive Metabolic Panel; Future  -     Ferritin; Future  -     Folate; Future  -     Haptoglobin; Future  -     Iron and TIBC; Future  -     TSH with reflex to Free T4 if abnormal; Future  -     Vitamin B12; Future  -     DERRICK + SHARON Panel; Future  -     Clinic Appointment Request; Future         Ac Gary MD

## 2024-02-26 DIAGNOSIS — R92.8 ABNORMAL MAMMOGRAM OF LEFT BREAST: Primary | ICD-10-CM

## 2024-03-07 DIAGNOSIS — N28.1 CYST OF KIDNEY, ACQUIRED: ICD-10-CM

## 2024-03-12 ENCOUNTER — APPOINTMENT (OUTPATIENT)
Dept: RADIOLOGY | Facility: CLINIC | Age: 54
End: 2024-03-12
Payer: COMMERCIAL

## 2024-03-14 ENCOUNTER — HOSPITAL ENCOUNTER (OUTPATIENT)
Dept: RADIOLOGY | Facility: CLINIC | Age: 54
Discharge: HOME | End: 2024-03-14
Payer: COMMERCIAL

## 2024-03-14 DIAGNOSIS — N28.1 CYST OF KIDNEY, ACQUIRED: ICD-10-CM

## 2024-03-14 PROCEDURE — 76770 US EXAM ABDO BACK WALL COMP: CPT | Performed by: RADIOLOGY

## 2024-03-14 PROCEDURE — 76770 US EXAM ABDO BACK WALL COMP: CPT

## 2024-03-21 ENCOUNTER — OFFICE VISIT (OUTPATIENT)
Dept: NEPHROLOGY | Facility: CLINIC | Age: 54
End: 2024-03-21
Payer: COMMERCIAL

## 2024-03-21 VITALS
HEIGHT: 67 IN | HEART RATE: 76 BPM | BODY MASS INDEX: 18.96 KG/M2 | DIASTOLIC BLOOD PRESSURE: 70 MMHG | SYSTOLIC BLOOD PRESSURE: 116 MMHG | WEIGHT: 120.8 LBS

## 2024-03-21 DIAGNOSIS — N28.1 CYST OF KIDNEY, ACQUIRED: Primary | ICD-10-CM

## 2024-03-21 PROCEDURE — 3008F BODY MASS INDEX DOCD: CPT | Performed by: INTERNAL MEDICINE

## 2024-03-21 PROCEDURE — 1036F TOBACCO NON-USER: CPT | Performed by: INTERNAL MEDICINE

## 2024-03-21 PROCEDURE — 99213 OFFICE O/P EST LOW 20 MIN: CPT | Performed by: INTERNAL MEDICINE

## 2024-03-21 ASSESSMENT — ENCOUNTER SYMPTOMS
HEMATOLOGIC/LYMPHATIC NEGATIVE: 1
MUSCULOSKELETAL NEGATIVE: 1
NEUROLOGICAL NEGATIVE: 1
GASTROINTESTINAL NEGATIVE: 1
CARDIOVASCULAR NEGATIVE: 1
EYES NEGATIVE: 1
RESPIRATORY NEGATIVE: 1
ENDOCRINE NEGATIVE: 1
PSYCHIATRIC NEGATIVE: 1
CONSTITUTIONAL NEGATIVE: 1
ALLERGIC/IMMUNOLOGIC NEGATIVE: 1

## 2024-03-21 NOTE — PROGRESS NOTES
Subjective   She was just diagnosed with Breast cancer  So going through a lot right now.    Patient ID: Carol Loza is a 54 y.o. female who presents for Follow-up (1 year ck/Review Renal US 3/7).  HPI  She is here for follow-up secondary to a right renal cyst.  Last blood work was obtained on March 11  BUN and creatinine are normal electrolytes are normal LFTs are unremarkable  Hemoglobin is 13.0  Medications are reviewed currently on no major medications that affect the kidneys  She had a renal ultrasound performed she has a septated cyst in the right kidney that is actually smaller it is now 2.7 cm there is also a 1.3 cm simple cyst in the right kidney  Review of Systems   Constitutional: Negative.    HENT: Negative.     Eyes: Negative.    Respiratory: Negative.     Cardiovascular: Negative.    Gastrointestinal: Negative.    Endocrine: Negative.    Genitourinary: Negative.    Musculoskeletal: Negative.    Skin: Negative.    Allergic/Immunologic: Negative.    Neurological: Negative.    Hematological: Negative.    Psychiatric/Behavioral: Negative.         Objective   Physical Exam  Constitutional:       Appearance: Normal appearance.   HENT:      Head: Normocephalic and atraumatic.      Right Ear: External ear normal.      Left Ear: External ear normal.      Nose: Nose normal.      Mouth/Throat:      Mouth: Mucous membranes are moist.      Pharynx: Oropharynx is clear.   Eyes:      Extraocular Movements: Extraocular movements intact.      Conjunctiva/sclera: Conjunctivae normal.      Pupils: Pupils are equal, round, and reactive to light.   Cardiovascular:      Rate and Rhythm: Normal rate and regular rhythm.   Pulmonary:      Effort: Pulmonary effort is normal.      Breath sounds: Normal breath sounds.   Abdominal:      General: Abdomen is flat.      Palpations: Abdomen is soft.   Skin:     General: Skin is warm and dry.   Neurological:      General: No focal deficit present.      Mental Status: She is alert  and oriented to person, place, and time.   Psychiatric:         Mood and Affect: Mood normal.         Behavior: Behavior normal.         Assessment/Plan   Problem List Items Addressed This Visit             ICD-10-CM    Cyst of kidney, acquired - Primary N28.1     Cyst look unremarkable  Simple cyst and have shrunk.  Will repeat US in 1 year.         Relevant Orders    Follow Up In Nephrology    US renal complete   We will recheck US in 1 year.  Has Hx of Breast Cancer so will watch the cyst     Right Renal Cyst  Breast Cancer    Addison Diez,  03/21/24 9:46 AM

## 2024-08-16 ENCOUNTER — APPOINTMENT (OUTPATIENT)
Dept: HEMATOLOGY/ONCOLOGY | Facility: CLINIC | Age: 54
End: 2024-08-16
Payer: COMMERCIAL

## 2024-08-29 ENCOUNTER — LAB (OUTPATIENT)
Dept: LAB | Facility: LAB | Age: 54
End: 2024-08-29
Payer: COMMERCIAL

## 2024-08-29 DIAGNOSIS — D72.819 LEUKOPENIA, UNSPECIFIED TYPE: ICD-10-CM

## 2024-08-29 PROCEDURE — 85025 COMPLETE CBC W/AUTO DIFF WBC: CPT

## 2024-08-29 PROCEDURE — 83550 IRON BINDING TEST: CPT

## 2024-08-29 PROCEDURE — 36415 COLL VENOUS BLD VENIPUNCTURE: CPT

## 2024-08-29 PROCEDURE — 82728 ASSAY OF FERRITIN: CPT

## 2024-08-29 PROCEDURE — 83010 ASSAY OF HAPTOGLOBIN QUANT: CPT

## 2024-08-29 PROCEDURE — 80053 COMPREHEN METABOLIC PANEL: CPT

## 2024-08-29 PROCEDURE — 83540 ASSAY OF IRON: CPT

## 2024-08-30 LAB
ALBUMIN SERPL BCP-MCNC: 4.5 G/DL (ref 3.4–5)
ALP SERPL-CCNC: 44 U/L (ref 33–110)
ALT SERPL W P-5'-P-CCNC: 12 U/L (ref 7–45)
ANION GAP SERPL CALC-SCNC: 12 MMOL/L (ref 10–20)
AST SERPL W P-5'-P-CCNC: 19 U/L (ref 9–39)
BASOPHILS # BLD AUTO: 0.03 X10*3/UL (ref 0–0.1)
BASOPHILS NFR BLD AUTO: 1.1 %
BILIRUB SERPL-MCNC: 0.5 MG/DL (ref 0–1.2)
BUN SERPL-MCNC: 12 MG/DL (ref 6–23)
CALCIUM SERPL-MCNC: 9.6 MG/DL (ref 8.6–10.6)
CHLORIDE SERPL-SCNC: 104 MMOL/L (ref 98–107)
CO2 SERPL-SCNC: 28 MMOL/L (ref 21–32)
CREAT SERPL-MCNC: 0.78 MG/DL (ref 0.5–1.05)
EGFRCR SERPLBLD CKD-EPI 2021: 89 ML/MIN/1.73M*2
EOSINOPHIL # BLD AUTO: 0.05 X10*3/UL (ref 0–0.7)
EOSINOPHIL NFR BLD AUTO: 1.8 %
ERYTHROCYTE [DISTWIDTH] IN BLOOD BY AUTOMATED COUNT: 11.7 % (ref 11.5–14.5)
FERRITIN SERPL-MCNC: 104 NG/ML (ref 8–150)
GLUCOSE SERPL-MCNC: 87 MG/DL (ref 74–99)
HAPTOGLOB SERPL NEPH-MCNC: <30 MG/DL (ref 30–200)
HCT VFR BLD AUTO: 37.7 % (ref 36–46)
HGB BLD-MCNC: 12.4 G/DL (ref 12–16)
IMM GRANULOCYTES # BLD AUTO: 0 X10*3/UL (ref 0–0.7)
IMM GRANULOCYTES NFR BLD AUTO: 0 % (ref 0–0.9)
IRON SATN MFR SERPL: 38 % (ref 25–45)
IRON SERPL-MCNC: 137 UG/DL (ref 35–150)
LYMPHOCYTES # BLD AUTO: 0.63 X10*3/UL (ref 1.2–4.8)
LYMPHOCYTES NFR BLD AUTO: 22.1 %
MCH RBC QN AUTO: 34.3 PG (ref 26–34)
MCHC RBC AUTO-ENTMCNC: 32.9 G/DL (ref 32–36)
MCV RBC AUTO: 104 FL (ref 80–100)
MONOCYTES # BLD AUTO: 0.35 X10*3/UL (ref 0.1–1)
MONOCYTES NFR BLD AUTO: 12.3 %
NEUTROPHILS # BLD AUTO: 1.79 X10*3/UL (ref 1.2–7.7)
NEUTROPHILS NFR BLD AUTO: 62.7 %
NRBC BLD-RTO: 0 /100 WBCS (ref 0–0)
PLATELET # BLD AUTO: 228 X10*3/UL (ref 150–450)
POTASSIUM SERPL-SCNC: 4.4 MMOL/L (ref 3.5–5.3)
PROT SERPL-MCNC: 6.4 G/DL (ref 6.4–8.2)
RBC # BLD AUTO: 3.61 X10*6/UL (ref 4–5.2)
SODIUM SERPL-SCNC: 140 MMOL/L (ref 136–145)
TIBC SERPL-MCNC: 356 UG/DL (ref 240–445)
UIBC SERPL-MCNC: 219 UG/DL (ref 110–370)
WBC # BLD AUTO: 2.9 X10*3/UL (ref 4.4–11.3)

## 2024-09-05 ENCOUNTER — LAB (OUTPATIENT)
Dept: LAB | Facility: LAB | Age: 54
End: 2024-09-05
Payer: COMMERCIAL

## 2024-09-05 ENCOUNTER — OFFICE VISIT (OUTPATIENT)
Dept: HEMATOLOGY/ONCOLOGY | Facility: CLINIC | Age: 54
End: 2024-09-05
Payer: COMMERCIAL

## 2024-09-05 ENCOUNTER — EDUCATION (OUTPATIENT)
Dept: HEMATOLOGY/ONCOLOGY | Facility: CLINIC | Age: 54
End: 2024-09-05

## 2024-09-05 VITALS
BODY MASS INDEX: 19.13 KG/M2 | TEMPERATURE: 97.3 F | SYSTOLIC BLOOD PRESSURE: 133 MMHG | WEIGHT: 122.14 LBS | RESPIRATION RATE: 16 BRPM | HEART RATE: 65 BPM | DIASTOLIC BLOOD PRESSURE: 82 MMHG | OXYGEN SATURATION: 98 %

## 2024-09-05 DIAGNOSIS — D72.819 LEUKOPENIA, UNSPECIFIED TYPE: ICD-10-CM

## 2024-09-05 PROCEDURE — 88184 FLOWCYTOMETRY/ TC 1 MARKER: CPT

## 2024-09-05 PROCEDURE — 82960 TEST FOR G6PD ENZYME: CPT

## 2024-09-05 PROCEDURE — 99214 OFFICE O/P EST MOD 30 MIN: CPT | Performed by: INTERNAL MEDICINE

## 2024-09-05 PROCEDURE — 88185 FLOWCYTOMETRY/TC ADD-ON: CPT

## 2024-09-05 PROCEDURE — 36415 COLL VENOUS BLD VENIPUNCTURE: CPT

## 2024-09-05 RX ORDER — TAMOXIFEN CITRATE 20 MG/1
TABLET ORAL
COMMUNITY

## 2024-09-05 RX ORDER — LIDOCAINE 50 MG/G
PATCH TOPICAL
COMMUNITY
Start: 2024-08-11

## 2024-09-05 RX ORDER — ACETAMINOPHEN, PSEUDOEPHEDRINE HYDROCHLORIDE, DEXTROMETHORPHAN HYDROBROMIDE, AND CHLORPHENIRAMINE MALEATE 15-30-500
KIT ORAL
COMMUNITY
Start: 2024-01-01

## 2024-09-05 RX ORDER — PREGABALIN 25 MG/1
25 CAPSULE ORAL 2 TIMES DAILY
COMMUNITY
Start: 2024-08-27 | End: 2024-11-25

## 2024-09-05 RX ORDER — OMEGA-3/DHA/EPA/FISH OIL 200-300 MG
CAPSULE ORAL
COMMUNITY
End: 2024-09-05 | Stop reason: SDUPTHER

## 2024-09-05 RX ORDER — PEDIATRIC MULTIVITAMIN NO.238
TABLET,CHEWABLE ORAL
COMMUNITY
End: 2024-09-05 | Stop reason: ALTCHOICE

## 2024-09-05 RX ORDER — NAPROXEN SODIUM 220 MG
TABLET ORAL
COMMUNITY
Start: 2024-01-02

## 2024-09-05 ASSESSMENT — PAIN SCALES - GENERAL: PAINLEVEL: 0-NO PAIN

## 2024-09-05 NOTE — PROGRESS NOTES
Patient ID: Carol Loza is a 54 y.o. female.  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  The patient sees Dr. Lott for routine evaluations.  A CBC in December 2023 revealed WBC 4.1 hemoglobin 12.7 g/dL , platelets 260,000/mm³.  Yet another CBC 3 months ago revealed WBC 3, hemoglobin 12.8 g/dL, , platelets 299,000/mm³.  4 years ago WBC 4.5 hemoglobin 12.6  platelets 361,000/mm³.  The patient is asymptomatic.    At interview on January 19, 2024 patient denied history of recurrent localized or systemic infections, weight loss, night sweats, chest pain, shortness of breath, nausea, vomiting, hematemesis, melena, hematochezia and hematuria.    The patient had come for follow-up on February 16, 2024 regarding history of neutropenia.  The patient is asymptomatic but anxious to know results of the tests performed.  The patient had come for follow-up on September 5, 2024 regarding history of neutropenia/leukopenia..  Since last evaluation the patient had an abnormal mammogram, underwent a lumpectomy, radiation therapy and was placed on tamoxifen 20 mg p.o. daily in [facility].  Today, she is feeling well.    Past medical history:    Leukopenia/neutropenia.    DCIS left breast s/p lumpectomy, radiation therapy, placed on tamoxifen 20 mg p.o. daily in March 2024.  Review of Systems   All other systems reviewed and are negative.       Objective   BSA: 1.62 meters squared  /82   Pulse 65   Temp 36.3 °C (97.3 °F) (Temporal)   Resp 16   Wt 55.4 kg (122 lb 2.2 oz)   SpO2 98%   BMI 19.13 kg/m²      has a past medical history of Arthritis (2021), Personal history of other mental and behavioral disorders,  Personal history of other mental and behavioral disorders, and Sacrococcygeal disorders, not elsewhere classified.   has a past surgical history that includes Other surgical history (2019);  section, low transverse (); and [place] tooth extraction ().  Family History   Problem Relation Name Age of Onset    Depression Mother      Hyperlipidemia Father Zig     Hypertension Father Zig      Oncology History    No history exists.       Carol Loza  reports that she has never smoked. She has never used smokeless tobacco.  She  reports current alcohol use.  She  reports no history of drug use.    Physical Exam  Constitutional:       Appearance: Normal appearance.   HENT:      Head: Normocephalic and atraumatic.      Nose: Nose normal.      Mouth/Throat:      Mouth: Mucous membranes are moist.      Pharynx: Oropharynx is clear.   Eyes:      Extraocular Movements: Extraocular movements intact.      Conjunctiva/sclera: Conjunctivae normal.      Pupils: Pupils are equal, round, and reactive to light.   Cardiovascular:      Rate and Rhythm: Normal rate and regular rhythm.   Pulmonary:      Effort: Pulmonary effort is normal.      Breath sounds: Normal breath sounds.   Abdominal:      General: Abdomen is flat. Bowel sounds are normal.      Palpations: Abdomen is soft.   Musculoskeletal:         General: Normal range of motion.      Cervical back: Normal range of motion and neck supple.   Neurological:      General: No focal deficit present.      Mental Status: She is alert and oriented to person, place, and time. Mental status is at baseline.   Psychiatric:         Mood and Affect: Mood normal.         Behavior: Behavior normal.         Thought Content: Thought content normal.         Judgment: Judgment normal.         WBC   Date/Time Value Ref Range Status   2024 11:41 AM 2.9 (L) 4.4 - 11.3 x10*3/uL Final   2024 03:13 PM 4.4 4.4 - 11.3 x10*3/uL Final   2023 01:09 PM 4.1 (L) 4.4  - 11.3 x10*3/uL Final     nRBC   Date Value Ref Range Status   08/29/2024 0.0 0.0 - 0.0 /100 WBCs Final   01/25/2024   Final     Comment:     Not Measured   11/28/2023 0.0 0.0 - 0.0 /100 WBCs Final     RBC   Date Value Ref Range Status   08/29/2024 3.61 (L) 4.00 - 5.20 x10*6/uL Final   01/25/2024 3.68 (L) 4.00 - 5.20 x10*6/uL Final   11/28/2023 3.64 (L) 4.00 - 5.20 x10*6/uL Final     Hemoglobin   Date Value Ref Range Status   08/29/2024 12.4 12.0 - 16.0 g/dL Final   01/25/2024 12.7 12.0 - 16.0 g/dL Final   11/28/2023 12.7 12.0 - 16.0 g/dL Final     Hematocrit   Date Value Ref Range Status   08/29/2024 37.7 36.0 - 46.0 % Final   01/25/2024 37.6 36.0 - 46.0 % Final   11/28/2023 38.8 36.0 - 46.0 % Final     MCV   Date/Time Value Ref Range Status   08/29/2024 11:41  (H) 80 - 100 fL Final   01/25/2024 03:13  (H) 80 - 100 fL Final   11/28/2023 01:09  (H) 80 - 100 fL Final     MCH   Date/Time Value Ref Range Status   08/29/2024 11:41 AM 34.3 (H) 26.0 - 34.0 pg Final   01/25/2024 03:13 PM 34.5 (H) 26.0 - 34.0 pg Final   11/28/2023 01:09 PM 34.9 (H) 26.0 - 34.0 pg Final     MCHC   Date/Time Value Ref Range Status   08/29/2024 11:41 AM 32.9 32.0 - 36.0 g/dL Final   01/25/2024 03:13 PM 33.8 32.0 - 36.0 g/dL Final   11/28/2023 01:09 PM 32.7 32.0 - 36.0 g/dL Final     RDW   Date/Time Value Ref Range Status   08/29/2024 11:41 AM 11.7 11.5 - 14.5 % Final   01/25/2024 03:13 PM 11.1 (L) 11.5 - 14.5 % Final   11/28/2023 01:09 PM 11.5 11.5 - 14.5 % Final     Platelets   Date/Time Value Ref Range Status   08/29/2024 11:41  150 - 450 x10*3/uL Final   01/25/2024 03:13  150 - 450 x10*3/uL Final   11/28/2023 01:09  150 - 450 x10*3/uL Final     MPV   Date/Time Value Ref Range Status   10/19/2023 11:44 AM 9.5 7.5 - 11.5 fL Final     Neutrophils %   Date/Time Value Ref Range Status   08/29/2024 11:41 AM 62.7 40.0 - 80.0 % Final   01/25/2024 03:13 PM 56.4 40.0 - 80.0 % Final   10/23/2021 09:02 AM 46.3 40.0 -  80.0 % Final   01/02/2020 11:41 AM 58.2 40.0 - 80.0 % Final     Immature Granulocytes %, Automated   Date/Time Value Ref Range Status   08/29/2024 11:41 AM 0.0 0.0 - 0.9 % Final     Comment:     Immature Granulocyte Count (IG) includes promyelocytes, myelocytes and metamyelocytes but does not include bands. Percent differential counts (%) should be interpreted in the context of the absolute cell counts (cells/UL).   01/25/2024 03:13 PM 0.2 0.0 - 0.9 % Final     Comment:     Immature Granulocyte Count (IG) includes promyelocytes, myelocytes and metamyelocytes but does not include bands. Percent differential counts (%) should be interpreted in the context of the absolute cell counts (cells/UL).   10/23/2021 09:02 AM 0.3 0.0 - 0.9 % Final     Comment:      Immature Granulocyte Count (IG) includes promyelocytes,    myelocytes and metamyelocytes but does not include bands.   Percent differential counts (%) should be interpreted in the   context of the absolute cell counts (cells/L).     01/02/2020 11:41 AM 0.7 0.0 - 0.9 % Final     Comment:      Percent differential counts (%) should be interpreted in the   context of the absolute cell counts (cells/L).       Lymphocytes %   Date/Time Value Ref Range Status   08/29/2024 11:41 AM 22.1 13.0 - 44.0 % Final   01/25/2024 03:13 PM 32.0 13.0 - 44.0 % Final   10/23/2021 09:02 AM 36.6 13.0 - 44.0 % Final   01/02/2020 11:41 AM 28.4 13.0 - 44.0 % Final     Monocytes %   Date/Time Value Ref Range Status   08/29/2024 11:41 AM 12.3 2.0 - 10.0 % Final   01/25/2024 03:13 PM 10.3 2.0 - 10.0 % Final   10/23/2021 09:02 AM 11.3 2.0 - 10.0 % Final   01/02/2020 11:41 AM 10.7 2.0 - 10.0 % Final     Eosinophils %   Date/Time Value Ref Range Status   08/29/2024 11:41 AM 1.8 0.0 - 6.0 % Final   01/25/2024 03:13 PM 0.2 0.0 - 6.0 % Final   10/23/2021 09:02 AM 3.0 0.0 - 6.0 % Final   01/02/2020 11:41 AM 0.9 0.0 - 6.0 % Final     Basophils %   Date/Time Value Ref Range Status   08/29/2024 11:41 AM  1.1 0.0 - 2.0 % Final   01/25/2024 03:13 PM 0.9 0.0 - 2.0 % Final   10/23/2021 09:02 AM 2.5 0.0 - 2.0 % Final   01/02/2020 11:41 AM 1.1 0.0 - 2.0 % Final     Neutrophils Absolute   Date/Time Value Ref Range Status   08/29/2024 11:41 AM 1.79 1.20 - 7.70 x10*3/uL Final     Comment:     Percent differential counts (%) should be interpreted in the context of the absolute cell counts (cells/uL).   01/25/2024 03:13 PM 2.47 1.20 - 7.70 x10*3/uL Final     Comment:     Percent differential counts (%) should be interpreted in the context of the absolute cell counts (cells/uL).   10/23/2021 09:02 AM 1.68 1.20 - 7.70 x10E9/L Final   01/02/2020 11:41 AM 2.60 1.20 - 7.70 x10E9/L Final     Immature Granulocytes Absolute, Automated   Date/Time Value Ref Range Status   08/29/2024 11:41 AM 0.00 0.00 - 0.70 x10*3/uL Final   01/25/2024 03:13 PM 0.01 0.00 - 0.70 x10*3/uL Final     Lymphocytes Absolute   Date/Time Value Ref Range Status   08/29/2024 11:41 AM 0.63 (L) 1.20 - 4.80 x10*3/uL Final   01/25/2024 03:13 PM 1.40 1.20 - 4.80 x10*3/uL Final   10/23/2021 09:02 AM 1.33 1.20 - 4.80 x10E9/L Final   01/02/2020 11:41 AM 1.27 1.20 - 4.80 x10E9/L Final     Monocytes Absolute   Date/Time Value Ref Range Status   08/29/2024 11:41 AM 0.35 0.10 - 1.00 x10*3/uL Final   01/25/2024 03:13 PM 0.45 0.10 - 1.00 x10*3/uL Final   10/23/2021 09:02 AM 0.41 0.10 - 1.00 x10E9/L Final   01/02/2020 11:41 AM 0.48 0.10 - 1.00 x10E9/L Final     Eosinophils Absolute   Date/Time Value Ref Range Status   08/29/2024 11:41 AM 0.05 0.00 - 0.70 x10*3/uL Final   01/25/2024 03:13 PM 0.01 0.00 - 0.70 x10*3/uL Final   10/23/2021 09:02 AM 0.11 0.00 - 0.70 x10E9/L Final   01/02/2020 11:41 AM 0.04 0.00 - 0.70 x10E9/L Final     Basophils Absolute   Date/Time Value Ref Range Status   08/29/2024 11:41 AM 0.03 0.00 - 0.10 x10*3/uL Final   01/25/2024 03:13 PM 0.04 0.00 - 0.10 x10*3/uL Final   10/23/2021 09:02 AM 0.09 0.00 - 0.10 x10E9/L Final   01/02/2020 11:41 AM 0.05 0.00 -  "0.10 x10E9/L Final       No components found for: \"PT\"  No results found for: \"APTT\"    Assessment/Plan    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  The patient denied history of recurrent localized or systemic infections.  Differential diagnosis includes constitutional, autoimmune, drug-induced, deficiency state, cyclical versus others.  Physical examination was within normal limits.  It would be prudent to rule out any correctable deficiencies.  I have recommended further evaluation with CBC, iron indicis, vitamin B12, folate, and TSH levels.  Depending on what we find we will make further recommendations.  There were no therapeutic recommendations today.  The patient understood appreciated all the details provided and was grateful.    The patient had come for follow-up on February 16, 2024 regarding history of leukopenia.    Thank you for allowing me to participate in care of your patient if you have any questions please feel free to call me.  Reviewed lab data with the patient.  CBC on January 25, 2024 revealed WBC 4.4 hemoglobin 12.7 g/dL, platelets 238,000/mm³.  Haptoglobin slightly decreased at 24 NG per mL and serum iron slightly elevated at 157 mcg/dL.  The patient is asymptomatic.  We have elected to follow clinically reassess in 6 months.  The patient is pleased and agreeable.  The patient had come for follow-up on September 5, 2024 regarding history of neutropenia/leukopenia..  Since last evaluation the patient had an abnormal mammogram, underwent a lumpectomy, radiation therapy and was placed on tamoxifen 20 mg p.o. daily in [facility].  Today, she is feeling well.  Reviewed lab data with the patient.  CBC revealed WBC 2.9 hemoglobin 12.4 g/dL platelets in reference range.  We have noted that haptoglobin is consistently less than 30.  DERRICK profile did not reveal any " abnormalities.  Ultrasound of liver did not reveal any abnormalities either.  I have discussed with the patient explained to her that low haptoglobin suggest liver disease or hemolysis.  However, her hemoglobin is normal.  I have recommended further evaluation with PNH profile, G6PD screen, LISETTE.  If all those come back negative would recommend further evaluation with genomic studies for spherocytosis and other rare diseases.  Return in 4 weeks.     Diagnoses and all orders for this visit:  Leukopenia, unspecified type  -     Referral to Hematology and Oncology  -     CBC and Auto Differential; Future  -     Comprehensive Metabolic Panel; Future  -     Ferritin; Future  -     Folate; Future  -     Haptoglobin; Future  -     Iron and TIBC; Future  -     TSH with reflex to Free T4 if abnormal; Future  -     Vitamin B12; Future  -     DERRICK + SHARON Panel; Future  -     Clinic Appointment Request; Future         Ac Gary MD

## 2024-09-05 NOTE — PROGRESS NOTES
"Patient seen by Dr. Leyva today in clinic. Reinforcement education provided regarding next steps with plan of care.      PER DR. LEYVA'S FUV NOTE TODAY: \"The patient had come for follow-up on September 5, 2024 regarding history of neutropenia/leukopenia..  Since last evaluation the patient had an abnormal mammogram, underwent a lumpectomy, radiation therapy and was placed on tamoxifen 20 mg p.o. daily in [facility].  Today, she is feeling well.  Reviewed lab data with the patient.  CBC revealed WBC 2.9 hemoglobin 12.4 g/dL platelets in reference range.  We have noted that haptoglobin is consistently less than 30.  DERRICK profile did not reveal any abnormalities.  Ultrasound of liver did not reveal any abnormalities either.  I have discussed with the patient explained to her that low haptoglobin suggest liver disease or hemolysis.  However, her hemoglobin is normal.  I have recommended further evaluation with PNH profile, G6PD screen, LISETTE.  If all those come back negative would recommend further evaluation with genomic studies for spherocytosis and other rare diseases.  Return in 4 weeks.\"    Reinforced labs to be drawn today with FUV in 4 weeks.  Pt going to lab today. Patient verbalizes understanding of plan of care via teachback method.                "

## 2024-09-06 LAB
DAT-POLYSPECIFIC: NORMAL
G6PD RBC QL: NORMAL

## 2024-09-07 LAB
FLOW CYTOMETRY SPECIALIST REVIEW: NORMAL
PATH REVIEW, PNH: NORMAL
PNH RESULTS: NEGATIVE

## 2024-10-03 ENCOUNTER — EDUCATION (OUTPATIENT)
Dept: HEMATOLOGY/ONCOLOGY | Facility: CLINIC | Age: 54
End: 2024-10-03

## 2024-10-03 ENCOUNTER — OFFICE VISIT (OUTPATIENT)
Dept: HEMATOLOGY/ONCOLOGY | Facility: CLINIC | Age: 54
End: 2024-10-03
Payer: COMMERCIAL

## 2024-10-03 VITALS
TEMPERATURE: 97.9 F | RESPIRATION RATE: 16 BRPM | DIASTOLIC BLOOD PRESSURE: 82 MMHG | SYSTOLIC BLOOD PRESSURE: 126 MMHG | OXYGEN SATURATION: 99 % | BODY MASS INDEX: 18.82 KG/M2 | WEIGHT: 120.15 LBS | HEART RATE: 92 BPM

## 2024-10-03 DIAGNOSIS — D72.819 LEUKOPENIA, UNSPECIFIED TYPE: ICD-10-CM

## 2024-10-03 PROCEDURE — 99213 OFFICE O/P EST LOW 20 MIN: CPT | Performed by: INTERNAL MEDICINE

## 2024-10-03 RX ORDER — NAPROXEN 250 MG/1
250 TABLET ORAL
COMMUNITY

## 2024-10-03 ASSESSMENT — PAIN SCALES - GENERAL: PAINLEVEL: 0-NO PAIN

## 2024-10-03 NOTE — PROGRESS NOTES
"Patient seen by Dr. Leyva today in clinic. Reinforcement education provided regarding next steps with plan of care.      PER DR. LEYVA'S FUV NOTE TODAY: \"The patient had come for follow-up on October 3, 2024 regarding history of leukopenia. The patient is asymptomatic. Denied history of recurrent localized or systemic infections night sweats fevers the patient is very active in fact return from hiking trip in [place]. Differential diagnosis of leukopenia includes constitutional, drug-induced, myelodysplastic syndrome, deficiency state, autoimmune versus others. All evaluations until now did not reveal any etiology to establish leukopenia. Have elected to follow clinically. Return in 1 year. \"    Reinforced need for labs prior to next FUV in 1 year.  Patient verbalizes understanding of plan of care via teachback method.             "

## 2024-10-03 NOTE — PROGRESS NOTES
Patient ID: Carol Loza is a 54 y.o. female.  Referring Physician: Ac Gary MD  [address], [place] [address]  Primary Care Provider: Chrystal Jorgensen DO  Visit Type: Initial Visit  Haptoglobin less than 30, etiology not yet known.  LISETTE, G6PD screen, PNH profile all in reference range.  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  The patient sees Dr. Lott for routine evaluations.  A CBC in December 2023 revealed WBC 4.1 hemoglobin 12.7 g/dL , platelets 260,000/mm³.  Yet another CBC 3 months ago revealed WBC 3, hemoglobin 12.8 g/dL, , platelets 299,000/mm³.  4 years ago WBC 4.5 hemoglobin 12.6  platelets 361,000/mm³.  The patient is asymptomatic.    At interview on January 19, 2024 patient denied history of recurrent localized or systemic infections, weight loss, night sweats, chest pain, shortness of breath, nausea, vomiting, hematemesis, melena, hematochezia and hematuria.    The patient had come for follow-up on February 16, 2024 regarding history of neutropenia.  The patient is asymptomatic but anxious to know results of the tests performed.  The patient had come for follow-up on September 5, 2024 regarding history of neutropenia/leukopenia..  Since last evaluation the patient had an abnormal mammogram, underwent a lumpectomy, radiation therapy and was placed on tamoxifen 20 mg p.o. daily in [facility].  Today, she is feeling well.  The patient had come for follow-up on October 3, 2024 regarding history of leukopenia.  The patient is asymptomatic.  Denied history of recurrent localized or systemic infections night sweats fevers    Past medical history:    Leukopenia/neutropenia.    DCIS left breast s/p lumpectomy, radiation therapy, placed on tamoxifen 20 mg p.o. daily in March 2024.  Review of Systems   All other systems reviewed and are  negative.       Objective   BSA: 1.61 meters squared  /82   Pulse 92   Temp 36.6 °C (97.9 °F) (Temporal)   Resp 16   Wt 54.5 kg (120 lb 2.4 oz)   SpO2 99%   BMI 18.82 kg/m²      has a past medical history of Arthritis (), Personal history of other mental and behavioral disorders, Personal history of other mental and behavioral disorders, and Sacrococcygeal disorders, not elsewhere classified.   has a past surgical history that includes Other surgical history (2019);  section, low transverse (); and [place] tooth extraction ().  Family History   Problem Relation Name Age of Onset    Depression Mother      Hyperlipidemia Father Zig     Hypertension Father Zig      Oncology History    No history exists.       Carol oLza  reports that she has never smoked. She has never used smokeless tobacco.  She  reports current alcohol use.  She  reports no history of drug use.    Physical Exam  Constitutional:       Appearance: Normal appearance.   HENT:      Head: Normocephalic and atraumatic.      Nose: Nose normal.      Mouth/Throat:      Mouth: Mucous membranes are moist.      Pharynx: Oropharynx is clear.   Eyes:      Extraocular Movements: Extraocular movements intact.      Conjunctiva/sclera: Conjunctivae normal.      Pupils: Pupils are equal, round, and reactive to light.   Cardiovascular:      Rate and Rhythm: Normal rate and regular rhythm.   Pulmonary:      Effort: Pulmonary effort is normal.      Breath sounds: Normal breath sounds.   Abdominal:      General: Abdomen is flat. Bowel sounds are normal.      Palpations: Abdomen is soft.   Musculoskeletal:         General: Normal range of motion.      Cervical back: Normal range of motion and neck supple.   Neurological:      General: No focal deficit present.      Mental Status: She is alert and oriented to person, place, and time. Mental status is at baseline.   Psychiatric:         Mood and Affect: Mood normal.          Behavior: Behavior normal.         Thought Content: Thought content normal.         Judgment: Judgment normal.       WBC   Date/Time Value Ref Range Status   08/29/2024 11:41 AM 2.9 (L) 4.4 - 11.3 x10*3/uL Final   01/25/2024 03:13 PM 4.4 4.4 - 11.3 x10*3/uL Final   11/28/2023 01:09 PM 4.1 (L) 4.4 - 11.3 x10*3/uL Final     nRBC   Date Value Ref Range Status   08/29/2024 0.0 0.0 - 0.0 /100 WBCs Final   01/25/2024   Final     Comment:     Not Measured   11/28/2023 0.0 0.0 - 0.0 /100 WBCs Final     RBC   Date Value Ref Range Status   08/29/2024 3.61 (L) 4.00 - 5.20 x10*6/uL Final   01/25/2024 3.68 (L) 4.00 - 5.20 x10*6/uL Final   11/28/2023 3.64 (L) 4.00 - 5.20 x10*6/uL Final     Hemoglobin   Date Value Ref Range Status   08/29/2024 12.4 12.0 - 16.0 g/dL Final   01/25/2024 12.7 12.0 - 16.0 g/dL Final   11/28/2023 12.7 12.0 - 16.0 g/dL Final     Hematocrit   Date Value Ref Range Status   08/29/2024 37.7 36.0 - 46.0 % Final   01/25/2024 37.6 36.0 - 46.0 % Final   11/28/2023 38.8 36.0 - 46.0 % Final     MCV   Date/Time Value Ref Range Status   08/29/2024 11:41  (H) 80 - 100 fL Final   01/25/2024 03:13  (H) 80 - 100 fL Final   11/28/2023 01:09  (H) 80 - 100 fL Final     MCH   Date/Time Value Ref Range Status   08/29/2024 11:41 AM 34.3 (H) 26.0 - 34.0 pg Final   01/25/2024 03:13 PM 34.5 (H) 26.0 - 34.0 pg Final   11/28/2023 01:09 PM 34.9 (H) 26.0 - 34.0 pg Final     MCHC   Date/Time Value Ref Range Status   08/29/2024 11:41 AM 32.9 32.0 - 36.0 g/dL Final   01/25/2024 03:13 PM 33.8 32.0 - 36.0 g/dL Final   11/28/2023 01:09 PM 32.7 32.0 - 36.0 g/dL Final     RDW   Date/Time Value Ref Range Status   08/29/2024 11:41 AM 11.7 11.5 - 14.5 % Final   01/25/2024 03:13 PM 11.1 (L) 11.5 - 14.5 % Final   11/28/2023 01:09 PM 11.5 11.5 - 14.5 % Final     Platelets   Date/Time Value Ref Range Status   08/29/2024 11:41  150 - 450 x10*3/uL Final   01/25/2024 03:13  150 - 450 x10*3/uL Final   11/28/2023 01:09 PM  260 150 - 450 x10*3/uL Final     MPV   Date/Time Value Ref Range Status   10/19/2023 11:44 AM 9.5 7.5 - 11.5 fL Final     Neutrophils %   Date/Time Value Ref Range Status   08/29/2024 11:41 AM 62.7 40.0 - 80.0 % Final   01/25/2024 03:13 PM 56.4 40.0 - 80.0 % Final   10/23/2021 09:02 AM 46.3 40.0 - 80.0 % Final   01/02/2020 11:41 AM 58.2 40.0 - 80.0 % Final     Immature Granulocytes %, Automated   Date/Time Value Ref Range Status   08/29/2024 11:41 AM 0.0 0.0 - 0.9 % Final     Comment:     Immature Granulocyte Count (IG) includes promyelocytes, myelocytes and metamyelocytes but does not include bands. Percent differential counts (%) should be interpreted in the context of the absolute cell counts (cells/UL).   01/25/2024 03:13 PM 0.2 0.0 - 0.9 % Final     Comment:     Immature Granulocyte Count (IG) includes promyelocytes, myelocytes and metamyelocytes but does not include bands. Percent differential counts (%) should be interpreted in the context of the absolute cell counts (cells/UL).   10/23/2021 09:02 AM 0.3 0.0 - 0.9 % Final     Comment:      Immature Granulocyte Count (IG) includes promyelocytes,    myelocytes and metamyelocytes but does not include bands.   Percent differential counts (%) should be interpreted in the   context of the absolute cell counts (cells/L).     01/02/2020 11:41 AM 0.7 0.0 - 0.9 % Final     Comment:      Percent differential counts (%) should be interpreted in the   context of the absolute cell counts (cells/L).       Lymphocytes %   Date/Time Value Ref Range Status   08/29/2024 11:41 AM 22.1 13.0 - 44.0 % Final   01/25/2024 03:13 PM 32.0 13.0 - 44.0 % Final   10/23/2021 09:02 AM 36.6 13.0 - 44.0 % Final   01/02/2020 11:41 AM 28.4 13.0 - 44.0 % Final     Monocytes %   Date/Time Value Ref Range Status   08/29/2024 11:41 AM 12.3 2.0 - 10.0 % Final   01/25/2024 03:13 PM 10.3 2.0 - 10.0 % Final   10/23/2021 09:02 AM 11.3 2.0 - 10.0 % Final   01/02/2020 11:41 AM 10.7 2.0 - 10.0 % Final      Eosinophils %   Date/Time Value Ref Range Status   08/29/2024 11:41 AM 1.8 0.0 - 6.0 % Final   01/25/2024 03:13 PM 0.2 0.0 - 6.0 % Final   10/23/2021 09:02 AM 3.0 0.0 - 6.0 % Final   01/02/2020 11:41 AM 0.9 0.0 - 6.0 % Final     Basophils %   Date/Time Value Ref Range Status   08/29/2024 11:41 AM 1.1 0.0 - 2.0 % Final   01/25/2024 03:13 PM 0.9 0.0 - 2.0 % Final   10/23/2021 09:02 AM 2.5 0.0 - 2.0 % Final   01/02/2020 11:41 AM 1.1 0.0 - 2.0 % Final     Neutrophils Absolute   Date/Time Value Ref Range Status   08/29/2024 11:41 AM 1.79 1.20 - 7.70 x10*3/uL Final     Comment:     Percent differential counts (%) should be interpreted in the context of the absolute cell counts (cells/uL).   01/25/2024 03:13 PM 2.47 1.20 - 7.70 x10*3/uL Final     Comment:     Percent differential counts (%) should be interpreted in the context of the absolute cell counts (cells/uL).   10/23/2021 09:02 AM 1.68 1.20 - 7.70 x10E9/L Final   01/02/2020 11:41 AM 2.60 1.20 - 7.70 x10E9/L Final     Immature Granulocytes Absolute, Automated   Date/Time Value Ref Range Status   08/29/2024 11:41 AM 0.00 0.00 - 0.70 x10*3/uL Final   01/25/2024 03:13 PM 0.01 0.00 - 0.70 x10*3/uL Final     Lymphocytes Absolute   Date/Time Value Ref Range Status   08/29/2024 11:41 AM 0.63 (L) 1.20 - 4.80 x10*3/uL Final   01/25/2024 03:13 PM 1.40 1.20 - 4.80 x10*3/uL Final   10/23/2021 09:02 AM 1.33 1.20 - 4.80 x10E9/L Final   01/02/2020 11:41 AM 1.27 1.20 - 4.80 x10E9/L Final     Monocytes Absolute   Date/Time Value Ref Range Status   08/29/2024 11:41 AM 0.35 0.10 - 1.00 x10*3/uL Final   01/25/2024 03:13 PM 0.45 0.10 - 1.00 x10*3/uL Final   10/23/2021 09:02 AM 0.41 0.10 - 1.00 x10E9/L Final   01/02/2020 11:41 AM 0.48 0.10 - 1.00 x10E9/L Final     Eosinophils Absolute   Date/Time Value Ref Range Status   08/29/2024 11:41 AM 0.05 0.00 - 0.70 x10*3/uL Final   01/25/2024 03:13 PM 0.01 0.00 - 0.70 x10*3/uL Final   10/23/2021 09:02 AM 0.11 0.00 - 0.70 x10E9/L Final  "  01/02/2020 11:41 AM 0.04 0.00 - 0.70 x10E9/L Final     Basophils Absolute   Date/Time Value Ref Range Status   08/29/2024 11:41 AM 0.03 0.00 - 0.10 x10*3/uL Final   01/25/2024 03:13 PM 0.04 0.00 - 0.10 x10*3/uL Final   10/23/2021 09:02 AM 0.09 0.00 - 0.10 x10E9/L Final   01/02/2020 11:41 AM 0.05 0.00 - 0.10 x10E9/L Final       No components found for: \"PT\"  No results found for: \"APTT\"    Assessment/Plan    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  The patient denied history of recurrent localized or systemic infections.  Differential diagnosis includes constitutional, autoimmune, drug-induced, deficiency state, cyclical versus others.  Physical examination was within normal limits.  It would be prudent to rule out any correctable deficiencies.  I have recommended further evaluation with CBC, iron indicis, vitamin B12, folate, and TSH levels.  Depending on what we find we will make further recommendations.  There were no therapeutic recommendations today.  The patient understood appreciated all the details provided and was grateful.    The patient had come for follow-up on February 16, 2024 regarding history of leukopenia.    Thank you for allowing me to participate in care of your patient if you have any questions please feel free to call me.  Reviewed lab data with the patient.  CBC on January 25, 2024 revealed WBC 4.4 hemoglobin 12.7 g/dL, platelets 238,000/mm³.  Haptoglobin slightly decreased at 24 NG per mL and serum iron slightly elevated at 157 mcg/dL.  The patient is asymptomatic.  We have elected to follow clinically reassess in 6 months.  The patient is pleased and agreeable.  The patient had come for follow-up on September 5, 2024 regarding history of neutropenia/leukopenia..  Since last evaluation the patient had an abnormal mammogram, underwent a lumpectomy, radiation therapy and was " placed on tamoxifen 20 mg p.o. daily in [facility].  Today, she is feeling well.  Reviewed lab data with the patient.  CBC revealed WBC 2.9 hemoglobin 12.4 g/dL platelets in reference range.  We have noted that haptoglobin is consistently less than 30.  DERRICK profile did not reveal any abnormalities.  Ultrasound of liver did not reveal any abnormalities either.  I have discussed with the patient explained to her that low haptoglobin suggest liver disease or hemolysis.  However, her hemoglobin is normal.  I have recommended further evaluation with PNH profile, G6PD screen, LISETTE.  If all those come back negative would recommend further evaluation with genomic studies for spherocytosis and other rare diseases.  Return in 4 weeks.  The patient had come for follow-up on October 3, 2024 regarding history of leukopenia.  The patient is asymptomatic.  Denied history of recurrent localized or systemic infections night sweats fevers the patient is very active in fact return from hiking trip in [place].  Differential diagnosis of leukopenia includes constitutional, drug-induced, myelodysplastic syndrome, deficiency state, autoimmune versus others.  All evaluations until now did not reveal any etiology to establish leukopenia.  Have elected to follow clinically.  Return in 1 year.     Diagnoses and all orders for this visit:  Leukopenia, unspecified type  -     Referral to Hematology and Oncology  -     CBC and Auto Differential; Future  -     Comprehensive Metabolic Panel; Future  -     Ferritin; Future  -     Folate; Future  -     Haptoglobin; Future  -     Iron and TIBC; Future  -     TSH with reflex to Free T4 if abnormal; Future  -     Vitamin B12; Future  -     DERRICK + SHARON Panel; Future  -     Clinic Appointment Request; Future         Ac Gary MD

## 2024-10-08 ASSESSMENT — PROMIS GLOBAL HEALTH SCALE
RATE_AVERAGE_PAIN: 2
RATE_SOCIAL_SATISFACTION: GOOD
CARRYOUT_SOCIAL_ACTIVITIES: GOOD
RATE_GENERAL_HEALTH: GOOD
RATE_QUALITY_OF_LIFE: GOOD
CARRYOUT_PHYSICAL_ACTIVITIES: MOSTLY
EMOTIONAL_PROBLEMS: SOMETIMES
RATE_PHYSICAL_HEALTH: GOOD
RATE_MENTAL_HEALTH: GOOD
RATE_AVERAGE_FATIGUE: MILD

## 2024-10-10 ENCOUNTER — APPOINTMENT (OUTPATIENT)
Dept: PRIMARY CARE | Facility: CLINIC | Age: 54
End: 2024-10-10
Payer: COMMERCIAL

## 2024-10-15 ENCOUNTER — APPOINTMENT (OUTPATIENT)
Dept: PRIMARY CARE | Facility: CLINIC | Age: 54
End: 2024-10-15
Payer: COMMERCIAL

## 2024-10-15 VITALS
TEMPERATURE: 98 F | BODY MASS INDEX: 19.76 KG/M2 | RESPIRATION RATE: 14 BRPM | DIASTOLIC BLOOD PRESSURE: 81 MMHG | HEART RATE: 71 BPM | SYSTOLIC BLOOD PRESSURE: 121 MMHG | HEIGHT: 67 IN | OXYGEN SATURATION: 96 % | WEIGHT: 125.9 LBS

## 2024-10-15 DIAGNOSIS — Z00.00 ENCOUNTER FOR WELLNESS EXAMINATION IN ADULT: Primary | ICD-10-CM

## 2024-10-15 DIAGNOSIS — M25.561 CHRONIC PAIN OF RIGHT KNEE: ICD-10-CM

## 2024-10-15 DIAGNOSIS — E56.9 VITAMIN DEFICIENCY: ICD-10-CM

## 2024-10-15 DIAGNOSIS — M79.644 THUMB PAIN, RIGHT: ICD-10-CM

## 2024-10-15 DIAGNOSIS — R20.2 NUMBNESS AND TINGLING OF RIGHT THUMB: ICD-10-CM

## 2024-10-15 DIAGNOSIS — G89.29 CHRONIC PAIN OF RIGHT KNEE: ICD-10-CM

## 2024-10-15 DIAGNOSIS — R20.0 NUMBNESS AND TINGLING OF RIGHT THUMB: ICD-10-CM

## 2024-10-15 PROCEDURE — 99396 PREV VISIT EST AGE 40-64: CPT | Performed by: FAMILY MEDICINE

## 2024-10-15 PROCEDURE — 1036F TOBACCO NON-USER: CPT | Performed by: FAMILY MEDICINE

## 2024-10-15 PROCEDURE — 3008F BODY MASS INDEX DOCD: CPT | Performed by: FAMILY MEDICINE

## 2024-10-15 RX ORDER — LIDOCAINE 50 MG/G
1 PATCH TOPICAL DAILY
Qty: 60 PATCH | Refills: 1 | Status: SHIPPED | OUTPATIENT
Start: 2024-10-15

## 2024-10-15 ASSESSMENT — ENCOUNTER SYMPTOMS: ARTHRALGIAS: 1

## 2024-10-15 ASSESSMENT — PATIENT HEALTH QUESTIONNAIRE - PHQ9
SUM OF ALL RESPONSES TO PHQ9 QUESTIONS 1 AND 2: 0
2. FEELING DOWN, DEPRESSED OR HOPELESS: NOT AT ALL
1. LITTLE INTEREST OR PLEASURE IN DOING THINGS: NOT AT ALL

## 2024-10-15 NOTE — PROGRESS NOTES
"Subjective   Patient ID: Carol Loza is a 54 y.o. female who presents for a wellness examination.     HPI   Diet: well balanced, avoid dairy  Supplements/vitamins: see list, tylenol scheduled 1000mg 2-3x/day  Alcohol use: 2 glasses wine/day  Caffeine intake: Y, 2 cups coffee/day   Exercise:  1x/week, stretching, cardio 2x/week and weights 2x/week  Sleep: no issues  Last dental appointment: routinely  Last eye appointment: 1 year ago, scheduled next week   Anxiety/Depression: denies  Menstrual cycles: postmenopause  Last pap smear: GYN 3/2023-   Mammogram: 2/23/20240, Dx mammogram schedule for Jan and MRI in July. Completed by radiation at [facility].   Fmhx breast cancer: N  DEXA: 1/25/2023, Osteopenia. Taking tamoxifen ( orders)  Cscope: 4/2024, follow up in 3 years   Fmhx colon cancer: N  CT cardiac score: none found   Tobacco use/Lung cancer screening: N  Recreational drug use: N  Immunizations: up to date    Did PT for right IT band, every other day. Followed by [facility] (Dr.Travis Handley).     Right thumb numbness   Right hand dominant  Sever b/l epicondylitis x 2 years (caused her to stop working). Still having issues with numbness  Had a NCT/EMG in 2023, 2024 (negative), has a MSK US this week, scheduled with [facility].   Saw Neurology NP recently for her symptoms.   Denies weakened   Her symptoms are caused by work   Has numbness into her thumb, wears a wrist and thumb brace   Has not had an xray for a while now, not seen Ortho-hand  [facility]   Has a throbbing pain of her thumb  Takes lyrica 50mg only as needed, takes tylenol 1000mg BID    Review of Systems   Musculoskeletal:  Positive for arthralgias.   All other systems reviewed and are negative.    Objective   /81 (BP Location: Left arm, Patient Position: Sitting, BP Cuff Size: Adult)   Pulse 71   Temp 36.7 °C (98 °F) (Temporal)   Resp 14   Ht 1.702 m (5' 7\")   Wt 57.1 kg (125 lb 14.4 oz)   SpO2 96%   " BMI 19.72 kg/m²     Physical Exam  Constitutional: Well developed, well nourished, alert and in no acute distress.  Head and Face: Normocephalic, atraumatic.  Eyes: Normal external exam. Pupils equally round and reactive to light with normal accommodation and extraocular movements intact.   ENT: External inspection of ears normal, tympanic membranes visualized and normal. Nasal mucosa, septum, and turbinates normal. Oral mucosa moist, oropharynx clear.   Neck: Supple, no lymphadenopathy or masses. Thyroid not enlarged, no palpable nodules.   Cardiovascular: Regular rate and rhythm, normal S1 and S2, no murmurs, gallops, or rubs. Radial pulses normal. No peripheral edema.   Pulmonary: No respiratory distress, lungs clear to auscultation bilaterally. No wheezes, rhonchi, rales.    Abdomen: Soft, nontender, nondistended, normal bowel sounds. No masses palpated.   Musculoskeletal: Gait normal. Muscle strength/tone normal of all 4 extremities. Normal range of motion of all extremities.   Skin: Warm, well perfused, normal skin turgor and color, no lesions or rashes noted.   Neurologic: Cranial nerves II-XII grossly intact. Deep tendon reflexes were 2+ and symmetric. Sensation normal bilaterally.   Psychiatric: Mood calm and affect normal.    Assessment/Plan   Recommendations for women annual wellness exam:   Make sure screenings for cervical and breast cancer are up to date if applicable- pap smears age [id number] with GYN  Discuss mammogram starting at age 40 or sooner if positive family history of breast cancer-up to date  STD screening   Follow a healthy diet (Dash diet, Mediterranean diet)  Exercise 150 min/wk   Maintain healthy weight (BMI < 25)-your BMI is 19.  Do not smoke   Alcohol in moderation (up to 1 drink/day)  Get enough sleep (7-8 hours/night)  Take a prenatal vitamin with folic acid if possibility of pregnancy   Make sure immunizations are up to date (influenza, Tdap)-up to date.   Premenopausal women  need minimum 1,000 mg calcium and 600-800 IU vitamin D daily (combination of diet + supplement)  Talk to your physician if you have concerns about depression or anxiety  Visit dentist twice yearly  Colon Cancer Screening-up to date

## 2024-10-31 ENCOUNTER — LAB (OUTPATIENT)
Dept: LAB | Facility: LAB | Age: 54
End: 2024-10-31
Payer: COMMERCIAL

## 2024-10-31 DIAGNOSIS — E56.9 VITAMIN DEFICIENCY: ICD-10-CM

## 2024-10-31 DIAGNOSIS — Z00.00 ENCOUNTER FOR WELLNESS EXAMINATION IN ADULT: ICD-10-CM

## 2024-10-31 PROCEDURE — 83036 HEMOGLOBIN GLYCOSYLATED A1C: CPT

## 2024-10-31 PROCEDURE — 80061 LIPID PANEL: CPT

## 2024-10-31 PROCEDURE — 82306 VITAMIN D 25 HYDROXY: CPT

## 2024-10-31 PROCEDURE — 36415 COLL VENOUS BLD VENIPUNCTURE: CPT

## 2024-11-01 LAB
25(OH)D3 SERPL-MCNC: 40 NG/ML (ref 30–100)
CHOLEST SERPL-MCNC: 137 MG/DL (ref 0–199)
CHOLESTEROL/HDL RATIO: 2.3
EST. AVERAGE GLUCOSE BLD GHB EST-MCNC: 94 MG/DL
HBA1C MFR BLD: 4.9 %
HDLC SERPL-MCNC: 58.9 MG/DL
LDLC SERPL CALC-MCNC: 52 MG/DL
NON HDL CHOLESTEROL: 78 MG/DL (ref 0–149)
TRIGL SERPL-MCNC: 129 MG/DL (ref 0–149)
VLDL: 26 MG/DL (ref 0–40)

## 2024-12-29 DIAGNOSIS — M25.561 CHRONIC PAIN OF RIGHT KNEE: ICD-10-CM

## 2024-12-29 DIAGNOSIS — G89.29 CHRONIC PAIN OF RIGHT KNEE: ICD-10-CM

## 2024-12-30 RX ORDER — LIDOCAINE 50 MG/G
1 PATCH TOPICAL DAILY
Qty: 30 PATCH | Refills: 0 | Status: SHIPPED | OUTPATIENT
Start: 2024-12-30 | End: 2025-01-29

## 2025-03-07 DIAGNOSIS — B00.9 RECURRENT HSV (HERPES SIMPLEX VIRUS): Primary | ICD-10-CM

## 2025-03-07 RX ORDER — LIDOCAINE 50 MG/G
1 PATCH TOPICAL DAILY
Qty: 60 PATCH | Refills: 0 | Status: SHIPPED | OUTPATIENT
Start: 2025-03-07 | End: 2025-04-06

## 2025-03-14 ENCOUNTER — HOSPITAL ENCOUNTER (OUTPATIENT)
Dept: RADIOLOGY | Facility: CLINIC | Age: 55
Discharge: HOME | End: 2025-03-14
Payer: COMMERCIAL

## 2025-03-14 DIAGNOSIS — N28.1 CYST OF KIDNEY, ACQUIRED: ICD-10-CM

## 2025-03-14 PROCEDURE — 76770 US EXAM ABDO BACK WALL COMP: CPT

## 2025-03-17 ENCOUNTER — APPOINTMENT (OUTPATIENT)
Dept: RADIOLOGY | Facility: CLINIC | Age: 55
End: 2025-03-17
Payer: COMMERCIAL

## 2025-03-19 ENCOUNTER — TELEPHONE (OUTPATIENT)
Dept: SCHEDULING | Age: 55
End: 2025-03-19
Payer: COMMERCIAL

## 2025-03-24 ENCOUNTER — APPOINTMENT (OUTPATIENT)
Dept: NEPHROLOGY | Facility: CLINIC | Age: 55
End: 2025-03-24
Payer: COMMERCIAL

## 2025-03-27 ENCOUNTER — APPOINTMENT (OUTPATIENT)
Dept: NEPHROLOGY | Facility: CLINIC | Age: 55
End: 2025-03-27
Payer: COMMERCIAL

## 2025-05-06 ENCOUNTER — TELEPHONE (OUTPATIENT)
Dept: SCHEDULING | Age: 55
End: 2025-05-06
Payer: COMMERCIAL

## 2025-05-06 NOTE — TELEPHONE ENCOUNTER
Spoke with Patient in regards to a request to cancel FUV on 10/2. Patients states she will be moving to another state and will not be rescheduling.

## 2025-05-06 NOTE — TELEPHONE ENCOUNTER
1st attempt calling patient in regards to a request to cancel her Fuv appt. No answer- left message on unidentifiable voicemail.    [facility] Lab Review- [facility]      11/4/2020   WBC 3.40 - 10.80 10*3/mm3 10.78   Neutrophils Absolute 1.70 - 7.00 10*3/mm3 8.93 (A)   Hemoglobin 13.0 - 17.7 g/dL 11.5 (A)   Hematocrit 37.5 - 51.0 % 37.2 (A)   Platelets 140 - 450 10*3/mm3 251   Creatinine 0.70 - 1.30 mg/dL 1.20   eGFR Non African Am >60 mL/min/1.73 61   BUN 6 - 20 mg/dL 15   Sodium 134 - 145 mmol/L 134   Potassium 3.5 - 4.7 mmol/L 4.8 (A)   Glucose 74 - 124 mg/dL 127 (A)   Calcium 8.5 - 10.2 mg/dL 9.1   Albumin 3.50 - 5.20 g/dL 3.70   Total Protein 6.3 - 8.0 g/dL 5.9 (A)   AST (SGOT) 0 - 40 U/L 12   ALT (SGPT) 0 - 41 U/L 15   Alkaline Phosphatase 38 - 116 U/L 85   Total Bilirubin 0.2 - 1.2 mg/dL 0.7   Iron 59 - 158 mcg/dL 28 (A)   TIBC 249 - 505 mcg/dL 237 (A)   Ferritin 30.00 - 400.00 ng/mL 285.50   Iron Saturation 14 - 48 % 12 (A)     APRN dictation noted. Pharmacy will continue to follow.     Thanks,   Jessenia Galvez, PharmD

## 2025-10-02 ENCOUNTER — APPOINTMENT (OUTPATIENT)
Dept: HEMATOLOGY/ONCOLOGY | Facility: CLINIC | Age: 55
End: 2025-10-02
Payer: COMMERCIAL

## 2025-10-15 ENCOUNTER — APPOINTMENT (OUTPATIENT)
Dept: PRIMARY CARE | Facility: CLINIC | Age: 55
End: 2025-10-15
Payer: COMMERCIAL